# Patient Record
Sex: FEMALE | Race: OTHER | HISPANIC OR LATINO | URBAN - METROPOLITAN AREA
[De-identification: names, ages, dates, MRNs, and addresses within clinical notes are randomized per-mention and may not be internally consistent; named-entity substitution may affect disease eponyms.]

---

## 2017-04-20 ENCOUNTER — OUTPATIENT (OUTPATIENT)
Dept: OUTPATIENT SERVICES | Facility: HOSPITAL | Age: 57
LOS: 1 days | End: 2017-04-20
Payer: SELF-PAY

## 2017-04-20 ENCOUNTER — APPOINTMENT (OUTPATIENT)
Dept: FAMILY MEDICINE | Facility: HOSPITAL | Age: 57
End: 2017-04-20

## 2017-04-20 VITALS
OXYGEN SATURATION: 96 % | DIASTOLIC BLOOD PRESSURE: 87 MMHG | HEART RATE: 72 BPM | RESPIRATION RATE: 14 BRPM | WEIGHT: 141 LBS | BODY MASS INDEX: 26.62 KG/M2 | SYSTOLIC BLOOD PRESSURE: 125 MMHG | HEIGHT: 61 IN

## 2017-04-20 VITALS — TEMPERATURE: 97.6 F

## 2017-04-20 DIAGNOSIS — Z80.1 FAMILY HISTORY OF MALIGNANT NEOPLASM OF TRACHEA, BRONCHUS AND LUNG: ICD-10-CM

## 2017-04-20 DIAGNOSIS — Z86.39 PERSONAL HISTORY OF OTHER ENDOCRINE, NUTRITIONAL AND METABOLIC DISEASE: ICD-10-CM

## 2017-04-21 ENCOUNTER — FORM ENCOUNTER (OUTPATIENT)
Age: 57
End: 2017-04-21

## 2017-04-22 PROCEDURE — 36415 COLL VENOUS BLD VENIPUNCTURE: CPT

## 2017-04-22 PROCEDURE — 84439 ASSAY OF FREE THYROXINE: CPT

## 2017-04-22 PROCEDURE — 71045 X-RAY EXAM CHEST 1 VIEW: CPT

## 2017-04-22 PROCEDURE — 83036 HEMOGLOBIN GLYCOSYLATED A1C: CPT

## 2017-04-22 PROCEDURE — 80053 COMPREHEN METABOLIC PANEL: CPT

## 2017-04-22 PROCEDURE — 84443 ASSAY THYROID STIM HORMONE: CPT

## 2017-04-22 PROCEDURE — G0463: CPT

## 2017-04-22 PROCEDURE — 85025 COMPLETE CBC W/AUTO DIFF WBC: CPT

## 2017-04-22 PROCEDURE — 86480 TB TEST CELL IMMUN MEASURE: CPT

## 2017-04-22 PROCEDURE — 80061 LIPID PANEL: CPT

## 2017-04-27 ENCOUNTER — MED ADMIN CHARGE (OUTPATIENT)
Age: 57
End: 2017-04-27

## 2017-04-27 ENCOUNTER — OUTPATIENT (OUTPATIENT)
Dept: OUTPATIENT SERVICES | Facility: HOSPITAL | Age: 57
LOS: 1 days | End: 2017-04-27
Payer: SELF-PAY

## 2017-04-27 ENCOUNTER — APPOINTMENT (OUTPATIENT)
Dept: FAMILY MEDICINE | Facility: HOSPITAL | Age: 57
End: 2017-04-27

## 2017-04-27 VITALS
SYSTOLIC BLOOD PRESSURE: 132 MMHG | TEMPERATURE: 97 F | WEIGHT: 143 LBS | DIASTOLIC BLOOD PRESSURE: 80 MMHG | BODY MASS INDEX: 27 KG/M2 | HEIGHT: 61 IN | HEART RATE: 83 BPM | OXYGEN SATURATION: 98 % | RESPIRATION RATE: 14 BRPM

## 2017-04-27 PROCEDURE — 80307 DRUG TEST PRSMV CHEM ANLYZR: CPT

## 2017-04-27 PROCEDURE — G0463: CPT

## 2017-05-03 ENCOUNTER — OUTPATIENT (OUTPATIENT)
Dept: OUTPATIENT SERVICES | Facility: HOSPITAL | Age: 57
LOS: 1 days | End: 2017-05-03
Payer: COMMERCIAL

## 2017-05-03 ENCOUNTER — APPOINTMENT (OUTPATIENT)
Dept: MAMMOGRAPHY | Facility: IMAGING CENTER | Age: 57
End: 2017-05-03

## 2017-05-03 DIAGNOSIS — Z00.8 ENCOUNTER FOR OTHER GENERAL EXAMINATION: ICD-10-CM

## 2017-05-03 PROCEDURE — 77067 SCR MAMMO BI INCL CAD: CPT

## 2017-05-03 PROCEDURE — 77063 BREAST TOMOSYNTHESIS BI: CPT

## 2017-06-19 ENCOUNTER — RESULT CHARGE (OUTPATIENT)
Age: 57
End: 2017-06-19

## 2017-06-20 ENCOUNTER — APPOINTMENT (OUTPATIENT)
Dept: FAMILY MEDICINE | Facility: HOSPITAL | Age: 57
End: 2017-06-20

## 2017-06-20 ENCOUNTER — OUTPATIENT (OUTPATIENT)
Dept: OUTPATIENT SERVICES | Facility: HOSPITAL | Age: 57
LOS: 1 days | End: 2017-06-20
Payer: SELF-PAY

## 2017-06-20 VITALS
BODY MASS INDEX: 26.81 KG/M2 | SYSTOLIC BLOOD PRESSURE: 126 MMHG | RESPIRATION RATE: 14 BRPM | TEMPERATURE: 98.7 F | DIASTOLIC BLOOD PRESSURE: 82 MMHG | WEIGHT: 142 LBS | HEART RATE: 75 BPM | OXYGEN SATURATION: 97 % | HEIGHT: 61 IN

## 2017-06-20 DIAGNOSIS — Z86.39 PERSONAL HISTORY OF OTHER ENDOCRINE, NUTRITIONAL AND METABOLIC DISEASE: ICD-10-CM

## 2017-06-20 PROCEDURE — 93016 CV STRESS TEST SUPVJ ONLY: CPT

## 2017-06-20 PROCEDURE — 93010 ELECTROCARDIOGRAM REPORT: CPT

## 2017-06-20 PROCEDURE — 93018 CV STRESS TEST I&R ONLY: CPT

## 2017-06-22 PROCEDURE — 93017 CV STRESS TEST TRACING ONLY: CPT

## 2017-06-22 PROCEDURE — 93005 ELECTROCARDIOGRAM TRACING: CPT

## 2017-06-22 PROCEDURE — G0463: CPT

## 2017-07-13 ENCOUNTER — APPOINTMENT (OUTPATIENT)
Dept: FAMILY MEDICINE | Facility: HOSPITAL | Age: 57
End: 2017-07-13

## 2017-07-13 ENCOUNTER — OUTPATIENT (OUTPATIENT)
Dept: OUTPATIENT SERVICES | Facility: HOSPITAL | Age: 57
LOS: 1 days | End: 2017-07-13
Payer: SELF-PAY

## 2017-07-13 VITALS
TEMPERATURE: 98.2 F | HEART RATE: 74 BPM | OXYGEN SATURATION: 97 % | BODY MASS INDEX: 26.43 KG/M2 | RESPIRATION RATE: 14 BRPM | DIASTOLIC BLOOD PRESSURE: 90 MMHG | HEIGHT: 61 IN | SYSTOLIC BLOOD PRESSURE: 119 MMHG | WEIGHT: 140 LBS

## 2017-07-13 PROCEDURE — G0463: CPT

## 2017-07-20 ENCOUNTER — RESULT CHARGE (OUTPATIENT)
Age: 57
End: 2017-07-20

## 2017-07-21 ENCOUNTER — OUTPATIENT (OUTPATIENT)
Dept: OUTPATIENT SERVICES | Facility: HOSPITAL | Age: 57
LOS: 1 days | End: 2017-07-21
Payer: SELF-PAY

## 2017-07-21 ENCOUNTER — APPOINTMENT (OUTPATIENT)
Dept: CARDIOLOGY | Facility: HOSPITAL | Age: 57
End: 2017-07-21

## 2017-07-21 VITALS
HEART RATE: 75 BPM | RESPIRATION RATE: 14 BRPM | WEIGHT: 142 LBS | HEIGHT: 61 IN | TEMPERATURE: 97.8 F | DIASTOLIC BLOOD PRESSURE: 82 MMHG | OXYGEN SATURATION: 97 % | BODY MASS INDEX: 26.81 KG/M2 | SYSTOLIC BLOOD PRESSURE: 128 MMHG

## 2017-07-21 DIAGNOSIS — Z78.9 OTHER SPECIFIED HEALTH STATUS: ICD-10-CM

## 2017-07-21 PROCEDURE — 93005 ELECTROCARDIOGRAM TRACING: CPT

## 2017-07-21 PROCEDURE — G0463: CPT

## 2017-07-27 ENCOUNTER — FORM ENCOUNTER (OUTPATIENT)
Age: 57
End: 2017-07-27

## 2017-07-28 ENCOUNTER — OUTPATIENT (OUTPATIENT)
Dept: OUTPATIENT SERVICES | Facility: HOSPITAL | Age: 57
LOS: 1 days | End: 2017-07-28
Payer: SELF-PAY

## 2017-07-28 PROCEDURE — 93018 CV STRESS TEST I&R ONLY: CPT

## 2017-07-28 PROCEDURE — 78452 HT MUSCLE IMAGE SPECT MULT: CPT

## 2017-07-28 PROCEDURE — 93017 CV STRESS TEST TRACING ONLY: CPT

## 2017-07-28 PROCEDURE — 78452 HT MUSCLE IMAGE SPECT MULT: CPT | Mod: 26

## 2017-07-28 PROCEDURE — 93016 CV STRESS TEST SUPVJ ONLY: CPT

## 2017-07-28 PROCEDURE — A9500: CPT

## 2017-08-16 ENCOUNTER — APPOINTMENT (OUTPATIENT)
Dept: FAMILY MEDICINE | Facility: HOSPITAL | Age: 57
End: 2017-08-16

## 2017-08-25 ENCOUNTER — OUTPATIENT (OUTPATIENT)
Dept: OUTPATIENT SERVICES | Facility: HOSPITAL | Age: 57
LOS: 1 days | End: 2017-08-25
Payer: COMMERCIAL

## 2017-08-25 ENCOUNTER — APPOINTMENT (OUTPATIENT)
Dept: FAMILY MEDICINE | Facility: HOSPITAL | Age: 57
End: 2017-08-25

## 2017-08-25 VITALS
BODY MASS INDEX: 26.43 KG/M2 | HEART RATE: 81 BPM | RESPIRATION RATE: 14 BRPM | DIASTOLIC BLOOD PRESSURE: 78 MMHG | WEIGHT: 140 LBS | OXYGEN SATURATION: 98 % | TEMPERATURE: 98 F | HEIGHT: 61 IN | SYSTOLIC BLOOD PRESSURE: 117 MMHG

## 2017-08-25 PROCEDURE — G0463: CPT

## 2017-09-14 ENCOUNTER — APPOINTMENT (OUTPATIENT)
Dept: DERMATOLOGY | Facility: CLINIC | Age: 57
End: 2017-09-14
Payer: SELF-PAY

## 2017-09-14 VITALS
DIASTOLIC BLOOD PRESSURE: 76 MMHG | HEIGHT: 60 IN | BODY MASS INDEX: 27.88 KG/M2 | WEIGHT: 142 LBS | SYSTOLIC BLOOD PRESSURE: 124 MMHG

## 2017-09-14 DIAGNOSIS — Z86.39 PERSONAL HISTORY OF OTHER ENDOCRINE, NUTRITIONAL AND METABOLIC DISEASE: ICD-10-CM

## 2017-09-14 PROCEDURE — 17110 DESTRUCTION B9 LES UP TO 14: CPT

## 2017-09-14 PROCEDURE — 99203 OFFICE O/P NEW LOW 30 MIN: CPT | Mod: 25

## 2017-09-21 ENCOUNTER — APPOINTMENT (OUTPATIENT)
Dept: FAMILY MEDICINE | Facility: HOSPITAL | Age: 57
End: 2017-09-21

## 2017-11-21 ENCOUNTER — APPOINTMENT (OUTPATIENT)
Dept: GASTROENTEROLOGY | Facility: HOSPITAL | Age: 57
End: 2017-11-21

## 2018-02-22 ENCOUNTER — OUTPATIENT (OUTPATIENT)
Dept: OUTPATIENT SERVICES | Facility: HOSPITAL | Age: 58
LOS: 1 days | End: 2018-02-22
Payer: SELF-PAY

## 2018-02-22 ENCOUNTER — RESULT CHARGE (OUTPATIENT)
Age: 58
End: 2018-02-22

## 2018-02-22 ENCOUNTER — APPOINTMENT (OUTPATIENT)
Dept: FAMILY MEDICINE | Facility: HOSPITAL | Age: 58
End: 2018-02-22

## 2018-02-22 VITALS
TEMPERATURE: 97.5 F | HEART RATE: 67 BPM | SYSTOLIC BLOOD PRESSURE: 127 MMHG | RESPIRATION RATE: 16 BRPM | DIASTOLIC BLOOD PRESSURE: 84 MMHG | WEIGHT: 141 LBS | BODY MASS INDEX: 27.54 KG/M2 | OXYGEN SATURATION: 100 %

## 2018-02-22 DIAGNOSIS — Z00.00 ENCOUNTER FOR GENERAL ADULT MEDICAL EXAMINATION WITHOUT ABNORMAL FINDINGS: ICD-10-CM

## 2018-02-22 LAB
BILIRUB UR QL STRIP: NEGATIVE
GLUCOSE UR-MCNC: NEGATIVE
HCG UR QL: 0.2 EU/DL
HGB UR QL STRIP.AUTO: NEGATIVE
KETONES UR-MCNC: NEGATIVE
LEUKOCYTE ESTERASE UR QL STRIP: NEGATIVE
NITRITE UR QL STRIP: NEGATIVE
PH UR STRIP: 5.5
PROT UR STRIP-MCNC: NEGATIVE
SP GR UR STRIP: <= 1.005

## 2018-02-22 PROCEDURE — 87801 DETECT AGNT MULT DNA AMPLI: CPT

## 2018-02-22 PROCEDURE — 87563 M. GENITALIUM AMP PROBE: CPT

## 2018-02-22 PROCEDURE — 87798 DETECT AGENT NOS DNA AMP: CPT

## 2018-02-22 PROCEDURE — 87491 CHLMYD TRACH DNA AMP PROBE: CPT

## 2018-02-22 PROCEDURE — G0463: CPT

## 2018-02-22 PROCEDURE — G0010: CPT

## 2018-02-22 PROCEDURE — 87591 N.GONORRHOEAE DNA AMP PROB: CPT

## 2018-02-23 DIAGNOSIS — Z23 ENCOUNTER FOR IMMUNIZATION: ICD-10-CM

## 2018-02-23 DIAGNOSIS — R31.9 HEMATURIA, UNSPECIFIED: ICD-10-CM

## 2018-02-23 DIAGNOSIS — R30.0 DYSURIA: ICD-10-CM

## 2018-03-22 ENCOUNTER — APPOINTMENT (OUTPATIENT)
Dept: FAMILY MEDICINE | Facility: HOSPITAL | Age: 58
End: 2018-03-22

## 2018-03-22 ENCOUNTER — OUTPATIENT (OUTPATIENT)
Dept: OUTPATIENT SERVICES | Facility: HOSPITAL | Age: 58
LOS: 1 days | End: 2018-03-22
Payer: COMMERCIAL

## 2018-03-22 VITALS
SYSTOLIC BLOOD PRESSURE: 118 MMHG | OXYGEN SATURATION: 98 % | HEART RATE: 87 BPM | BODY MASS INDEX: 27.73 KG/M2 | WEIGHT: 142 LBS | DIASTOLIC BLOOD PRESSURE: 81 MMHG | RESPIRATION RATE: 16 BRPM | TEMPERATURE: 98.2 F

## 2018-03-22 DIAGNOSIS — Z00.00 ENCOUNTER FOR GENERAL ADULT MEDICAL EXAMINATION WITHOUT ABNORMAL FINDINGS: ICD-10-CM

## 2018-03-22 DIAGNOSIS — L91.8 OTHER HYPERTROPHIC DISORDERS OF THE SKIN: ICD-10-CM

## 2018-03-22 PROCEDURE — G0463: CPT

## 2018-03-22 RX ORDER — NITROFURANTOIN MACROCRYSTALS 100 MG/1
100 CAPSULE ORAL
Qty: 10 | Refills: 0 | Status: DISCONTINUED | COMMUNITY
Start: 2018-02-22 | End: 2018-03-22

## 2018-03-23 DIAGNOSIS — E78.5 HYPERLIPIDEMIA, UNSPECIFIED: ICD-10-CM

## 2018-03-23 DIAGNOSIS — E03.9 HYPOTHYROIDISM, UNSPECIFIED: ICD-10-CM

## 2018-03-26 ENCOUNTER — MOBILE ON CALL (OUTPATIENT)
Age: 58
End: 2018-03-26

## 2018-04-19 ENCOUNTER — OUTPATIENT (OUTPATIENT)
Dept: OUTPATIENT SERVICES | Facility: HOSPITAL | Age: 58
LOS: 1 days | End: 2018-04-19
Payer: COMMERCIAL

## 2018-04-19 DIAGNOSIS — E78.5 HYPERLIPIDEMIA, UNSPECIFIED: ICD-10-CM

## 2018-04-19 DIAGNOSIS — E03.9 HYPOTHYROIDISM, UNSPECIFIED: ICD-10-CM

## 2018-04-19 DIAGNOSIS — R73.03 PREDIABETES: ICD-10-CM

## 2018-04-19 PROCEDURE — 84439 ASSAY OF FREE THYROXINE: CPT

## 2018-04-19 PROCEDURE — 85025 COMPLETE CBC W/AUTO DIFF WBC: CPT

## 2018-04-19 PROCEDURE — 84443 ASSAY THYROID STIM HORMONE: CPT

## 2018-04-19 PROCEDURE — 80053 COMPREHEN METABOLIC PANEL: CPT

## 2018-04-19 PROCEDURE — 80061 LIPID PANEL: CPT

## 2018-04-19 PROCEDURE — 83036 HEMOGLOBIN GLYCOSYLATED A1C: CPT

## 2018-04-19 PROCEDURE — 36415 COLL VENOUS BLD VENIPUNCTURE: CPT

## 2018-04-23 ENCOUNTER — OUTPATIENT (OUTPATIENT)
Dept: OUTPATIENT SERVICES | Facility: HOSPITAL | Age: 58
LOS: 1 days | End: 2018-04-23
Payer: COMMERCIAL

## 2018-04-23 ENCOUNTER — APPOINTMENT (OUTPATIENT)
Dept: FAMILY MEDICINE | Facility: HOSPITAL | Age: 58
End: 2018-04-23

## 2018-04-23 VITALS
WEIGHT: 141 LBS | HEART RATE: 83 BPM | BODY MASS INDEX: 27.68 KG/M2 | DIASTOLIC BLOOD PRESSURE: 77 MMHG | HEIGHT: 60 IN | TEMPERATURE: 98.1 F | RESPIRATION RATE: 16 BRPM | SYSTOLIC BLOOD PRESSURE: 123 MMHG | OXYGEN SATURATION: 97 %

## 2018-04-23 DIAGNOSIS — Z00.00 ENCOUNTER FOR GENERAL ADULT MEDICAL EXAMINATION WITHOUT ABNORMAL FINDINGS: ICD-10-CM

## 2018-04-23 PROCEDURE — G0463: CPT

## 2018-04-24 DIAGNOSIS — J01.90 ACUTE SINUSITIS, UNSPECIFIED: ICD-10-CM

## 2018-04-24 DIAGNOSIS — E03.9 HYPOTHYROIDISM, UNSPECIFIED: ICD-10-CM

## 2018-05-04 ENCOUNTER — APPOINTMENT (OUTPATIENT)
Dept: MAMMOGRAPHY | Facility: IMAGING CENTER | Age: 58
End: 2018-05-04

## 2018-05-10 ENCOUNTER — OUTPATIENT (OUTPATIENT)
Dept: OUTPATIENT SERVICES | Facility: HOSPITAL | Age: 58
LOS: 1 days | End: 2018-05-10
Payer: COMMERCIAL

## 2018-05-10 ENCOUNTER — APPOINTMENT (OUTPATIENT)
Dept: MAMMOGRAPHY | Facility: IMAGING CENTER | Age: 58
End: 2018-05-10
Payer: COMMERCIAL

## 2018-05-10 DIAGNOSIS — Z00.8 ENCOUNTER FOR OTHER GENERAL EXAMINATION: ICD-10-CM

## 2018-05-10 PROCEDURE — 77063 BREAST TOMOSYNTHESIS BI: CPT

## 2018-05-10 PROCEDURE — 77067 SCR MAMMO BI INCL CAD: CPT | Mod: 26

## 2018-05-10 PROCEDURE — 77067 SCR MAMMO BI INCL CAD: CPT

## 2018-05-10 PROCEDURE — 77063 BREAST TOMOSYNTHESIS BI: CPT | Mod: 26

## 2018-06-30 ENCOUNTER — MOBILE ON CALL (OUTPATIENT)
Age: 58
End: 2018-06-30

## 2018-08-22 ENCOUNTER — RX RENEWAL (OUTPATIENT)
Age: 58
End: 2018-08-22

## 2018-09-05 ENCOUNTER — OUTPATIENT (OUTPATIENT)
Dept: OUTPATIENT SERVICES | Facility: HOSPITAL | Age: 58
LOS: 1 days | End: 2018-09-05
Payer: SELF-PAY

## 2018-09-05 ENCOUNTER — APPOINTMENT (OUTPATIENT)
Dept: FAMILY MEDICINE | Facility: HOSPITAL | Age: 58
End: 2018-09-05

## 2018-09-05 VITALS
DIASTOLIC BLOOD PRESSURE: 72 MMHG | HEART RATE: 74 BPM | WEIGHT: 135 LBS | TEMPERATURE: 97.4 F | BODY MASS INDEX: 26.5 KG/M2 | HEIGHT: 60 IN | RESPIRATION RATE: 14 BRPM | OXYGEN SATURATION: 98 % | SYSTOLIC BLOOD PRESSURE: 108 MMHG

## 2018-09-05 DIAGNOSIS — Z00.00 ENCOUNTER FOR GENERAL ADULT MEDICAL EXAMINATION WITHOUT ABNORMAL FINDINGS: ICD-10-CM

## 2018-09-05 PROCEDURE — 82274 ASSAY TEST FOR BLOOD FECAL: CPT

## 2018-09-05 PROCEDURE — G0463: CPT

## 2018-09-05 RX ORDER — AMOXICILLIN AND CLAVULANATE POTASSIUM 875; 125 MG/1; MG/1
875-125 TABLET, COATED ORAL TWICE DAILY
Qty: 14 | Refills: 0 | Status: DISCONTINUED | COMMUNITY
Start: 2018-04-23 | End: 2018-09-05

## 2018-09-05 RX ORDER — ATORVASTATIN CALCIUM 20 MG/1
20 TABLET, FILM COATED ORAL DAILY
Qty: 30 | Refills: 1 | Status: DISCONTINUED | COMMUNITY
Start: 2017-06-20 | End: 2018-09-05

## 2018-09-05 NOTE — HISTORY OF PRESENT ILLNESS
[FreeTextEntry1] : HLD and Hypothyroid follow up [de-identified] : 58 year old female with PMH of HLD, Hypothyroidism and prediabetes is here for a follow up. Pt states that she is compliant with her meds and feels well. No record of last PAP Smear. As per pt was 2 years ago. Due for Colon Cancer screening. Denies any complaints. No Fever, Chills, Nausea, Vomiting, Diarrhea, Headache, Chest Pain, Shortness of breath or Abdominal pain.\par

## 2018-09-05 NOTE — PHYSICAL EXAM

## 2018-09-05 NOTE — ASSESSMENT
[FreeTextEntry1] : # HLD\par - Pt is not on any medications and does not want to take any meds\par - Has been losing weight\par - Wants to manage it with lifestyle and diet changes\par - Repeat lipid panel in 6 months\par \par # Hypothyroidism - Stable\par - Cont Synthroid\par \par # Prediabetes\par - Cont Metformin\par - Counselled on diet and exercise\par \par # HM\par - FIT testing given\par - Mammogram May 2018 WNL repeat in 1 year\par - f/u for PAP smear\par

## 2018-09-05 NOTE — HEALTH RISK ASSESSMENT
[No falls in past year] : Patient reported no falls in the past year [0] : 2) Feeling down, depressed, or hopeless: Not at all (0) [] : No [[id number]] : 0

## 2018-09-07 DIAGNOSIS — Z12.11 ENCOUNTER FOR SCREENING FOR MALIGNANT NEOPLASM OF COLON: ICD-10-CM

## 2018-09-07 DIAGNOSIS — E03.9 HYPOTHYROIDISM, UNSPECIFIED: ICD-10-CM

## 2018-09-18 LAB — HEMOCCULT STL QL IA: NEGATIVE

## 2018-10-26 ENCOUNTER — OUTPATIENT (OUTPATIENT)
Dept: OUTPATIENT SERVICES | Facility: HOSPITAL | Age: 58
LOS: 1 days | End: 2018-10-26
Payer: SELF-PAY

## 2018-10-26 ENCOUNTER — APPOINTMENT (OUTPATIENT)
Dept: FAMILY MEDICINE | Facility: HOSPITAL | Age: 58
End: 2018-10-26

## 2018-10-26 VITALS
WEIGHT: 136 LBS | TEMPERATURE: 97.9 F | OXYGEN SATURATION: 97 % | BODY MASS INDEX: 26.56 KG/M2 | SYSTOLIC BLOOD PRESSURE: 138 MMHG | HEART RATE: 70 BPM | RESPIRATION RATE: 14 BRPM | DIASTOLIC BLOOD PRESSURE: 77 MMHG

## 2018-10-26 DIAGNOSIS — Z00.00 ENCOUNTER FOR GENERAL ADULT MEDICAL EXAMINATION WITHOUT ABNORMAL FINDINGS: ICD-10-CM

## 2018-10-26 PROCEDURE — 87624 HPV HI-RISK TYP POOLED RSLT: CPT

## 2018-10-26 PROCEDURE — G0463: CPT

## 2018-10-29 DIAGNOSIS — Z12.4 ENCOUNTER FOR SCREENING FOR MALIGNANT NEOPLASM OF CERVIX: ICD-10-CM

## 2018-10-29 LAB — HPV HIGH+LOW RISK DNA PNL CVX: NOT DETECTED

## 2018-11-08 LAB — CYTOLOGY CVX/VAG DOC THIN PREP: NORMAL

## 2019-02-21 ENCOUNTER — APPOINTMENT (OUTPATIENT)
Dept: FAMILY MEDICINE | Facility: HOSPITAL | Age: 59
End: 2019-02-21

## 2019-02-21 ENCOUNTER — OUTPATIENT (OUTPATIENT)
Dept: OUTPATIENT SERVICES | Facility: HOSPITAL | Age: 59
LOS: 1 days | End: 2019-02-21
Payer: SELF-PAY

## 2019-02-21 VITALS
OXYGEN SATURATION: 99 % | BODY MASS INDEX: 26.76 KG/M2 | TEMPERATURE: 98.2 F | HEART RATE: 77 BPM | SYSTOLIC BLOOD PRESSURE: 126 MMHG | RESPIRATION RATE: 14 BRPM | WEIGHT: 137 LBS | DIASTOLIC BLOOD PRESSURE: 83 MMHG

## 2019-02-21 DIAGNOSIS — Z00.00 ENCOUNTER FOR GENERAL ADULT MEDICAL EXAMINATION WITHOUT ABNORMAL FINDINGS: ICD-10-CM

## 2019-02-21 DIAGNOSIS — Z86.39 PERSONAL HISTORY OF OTHER ENDOCRINE, NUTRITIONAL AND METABOLIC DISEASE: ICD-10-CM

## 2019-02-21 RX ORDER — ASPIRIN 81 MG/1
81 TABLET, CHEWABLE ORAL DAILY
Qty: 1 | Refills: 1 | Status: DISCONTINUED | COMMUNITY
Start: 2017-06-20 | End: 2019-02-21

## 2019-02-21 RX ORDER — METFORMIN HYDROCHLORIDE 500 MG/1
500 TABLET, COATED ORAL DAILY
Qty: 90 | Refills: 2 | Status: DISCONTINUED | COMMUNITY
Start: 2018-04-23 | End: 2019-02-21

## 2019-02-21 NOTE — HISTORY OF PRESENT ILLNESS
[FreeTextEntry1] : follow up thyroid [de-identified] : 59 year old female w/ pmh hypothyroidism coming to clinic for follow up. patient states overall is feeling well. states taking medication as rx for thyroid. is not taking metformin at this time.

## 2019-02-21 NOTE — PLAN
[FreeTextEntry1] : 58 year old female as above \par \par #dysuria \par - poct ua- negative \par - nitrofuratoin x 5 days bid \par - vaginitis panel \par \par #need for hep b vaccine \par - number 3 given

## 2019-02-21 NOTE — REVIEW OF SYSTEMS
[Fever] : no fever [Chills] : no chills [Fatigue] : no fatigue [Discharge] : no discharge [Pain] : no pain [Vision Problems] : no vision problems [Earache] : no earache [Hearing Loss] : no hearing loss [Sore Throat] : no sore throat [Chest Pain] : no chest pain [Palpitations] : no palpitations [Shortness Of Breath] : no shortness of breath [Wheezing] : no wheezing [Cough] : no cough [Abdominal Pain] : no abdominal pain [Nausea] : no nausea [Vomiting] : no vomiting [Dysuria] : no dysuria [Hematuria] : no hematuria [Vaginal Discharge] : no vaginal discharge [Joint Pain] : no joint pain [Joint Stiffness] : no joint stiffness [Joint Swelling] : no joint swelling [Itching] : no itching [Headache] : no headache [Dizziness] : no dizziness

## 2019-02-21 NOTE — ASSESSMENT
[FreeTextEntry1] : 60 year old female as above \par \par #hypothyroidism \par - refill levothyroxine\par - check tft\par \par rtc 1 month

## 2019-02-21 NOTE — PHYSICAL EXAM
[No Acute Distress] : no acute distress [Well Nourished] : well nourished [PERRL] : pupils equal round and reactive to light [EOMI] : extraocular movements intact [Normal Outer Ear/Nose] : the outer ears and nose were normal in appearance [No JVD] : no jugular venous distention [Supple] : supple [No Respiratory Distress] : no respiratory distress  [Clear to Auscultation] : lungs were clear to auscultation bilaterally [No Accessory Muscle Use] : no accessory muscle use [Normal S1, S2] : normal S1 and S2 [No Edema] : there was no peripheral edema [Soft] : abdomen soft [Non Tender] : non-tender [Non-distended] : non-distended [Normal Bowel Sounds] : normal bowel sounds [No CVA Tenderness] : no CVA  tenderness [No Spinal Tenderness] : no spinal tenderness [Grossly Normal Strength/Tone] : grossly normal strength/tone [Normal Gait] : normal gait [No Focal Deficits] : no focal deficits [Normal Affect] : the affect was normal [Normal Insight/Judgement] : insight and judgment were intact

## 2019-02-22 DIAGNOSIS — E03.9 HYPOTHYROIDISM, UNSPECIFIED: ICD-10-CM

## 2019-04-01 ENCOUNTER — OUTPATIENT (OUTPATIENT)
Dept: OUTPATIENT SERVICES | Facility: HOSPITAL | Age: 59
LOS: 1 days | End: 2019-04-01

## 2019-04-01 DIAGNOSIS — Z00.00 ENCOUNTER FOR GENERAL ADULT MEDICAL EXAMINATION WITHOUT ABNORMAL FINDINGS: ICD-10-CM

## 2019-04-01 PROCEDURE — 84481 FREE ASSAY (FT-3): CPT

## 2019-04-01 PROCEDURE — G0463: CPT

## 2019-04-01 PROCEDURE — 84443 ASSAY THYROID STIM HORMONE: CPT

## 2019-04-01 PROCEDURE — 80061 LIPID PANEL: CPT

## 2019-04-01 PROCEDURE — 83036 HEMOGLOBIN GLYCOSYLATED A1C: CPT

## 2019-04-01 PROCEDURE — 80048 BASIC METABOLIC PNL TOTAL CA: CPT

## 2019-04-01 PROCEDURE — 84439 ASSAY OF FREE THYROXINE: CPT

## 2019-04-02 LAB
ANION GAP SERPL CALC-SCNC: 13 MMOL/L
BASOPHILS # BLD AUTO: 0.05 K/UL
BASOPHILS NFR BLD AUTO: 0.8 %
BUN SERPL-MCNC: 15 MG/DL
CALCIUM SERPL-MCNC: 9.6 MG/DL
CHLORIDE SERPL-SCNC: 102 MMOL/L
CO2 SERPL-SCNC: 25 MMOL/L
CREAT SERPL-MCNC: 0.73 MG/DL
EOSINOPHIL # BLD AUTO: 0.38 K/UL
EOSINOPHIL NFR BLD AUTO: 6.1 %
GLUCOSE SERPL-MCNC: 101 MG/DL
HCT VFR BLD CALC: 41.4 %
HGB BLD-MCNC: 13.7 G/DL
IMM GRANULOCYTES NFR BLD AUTO: 0.3 %
LYMPHOCYTES # BLD AUTO: 1.93 K/UL
LYMPHOCYTES NFR BLD AUTO: 30.9 %
MAN DIFF?: NORMAL
MCHC RBC-ENTMCNC: 29.3 PG
MCHC RBC-ENTMCNC: 33.1 GM/DL
MCV RBC AUTO: 88.5 FL
MONOCYTES # BLD AUTO: 0.65 K/UL
MONOCYTES NFR BLD AUTO: 10.4 %
NEUTROPHILS # BLD AUTO: 3.21 K/UL
NEUTROPHILS NFR BLD AUTO: 51.5 %
PLATELET # BLD AUTO: 295 K/UL
POTASSIUM SERPL-SCNC: 4.4 MMOL/L
RBC # BLD: 4.68 M/UL
RBC # FLD: 14.3 %
SODIUM SERPL-SCNC: 140 MMOL/L
T3FREE SERPL-MCNC: 3.07 PG/ML
T4 FREE SERPL-MCNC: 1.4 NG/DL
TSH SERPL-ACNC: 0.74 UIU/ML
WBC # FLD AUTO: 6.24 K/UL

## 2019-04-13 LAB
CHOLEST SERPL-MCNC: 251 MG/DL
CHOLEST/HDLC SERPL: 3.9 RATIO
HBA1C MFR BLD HPLC: 5.7 %
HDLC SERPL-MCNC: 65 MG/DL
LDLC SERPL CALC-MCNC: 160 MG/DL
TRIGL SERPL-MCNC: 132 MG/DL

## 2019-05-03 ENCOUNTER — APPOINTMENT (OUTPATIENT)
Age: 59
End: 2019-05-03

## 2019-05-03 ENCOUNTER — OUTPATIENT (OUTPATIENT)
Dept: OUTPATIENT SERVICES | Facility: HOSPITAL | Age: 59
LOS: 1 days | End: 2019-05-03
Payer: COMMERCIAL

## 2019-05-03 VITALS
SYSTOLIC BLOOD PRESSURE: 131 MMHG | DIASTOLIC BLOOD PRESSURE: 75 MMHG | HEIGHT: 60 IN | WEIGHT: 138 LBS | BODY MASS INDEX: 27.09 KG/M2 | RESPIRATION RATE: 14 BRPM | OXYGEN SATURATION: 97 % | TEMPERATURE: 98.1 F | HEART RATE: 82 BPM

## 2019-05-03 DIAGNOSIS — Z00.00 ENCOUNTER FOR GENERAL ADULT MEDICAL EXAMINATION WITHOUT ABNORMAL FINDINGS: ICD-10-CM

## 2019-05-03 PROCEDURE — G0463: CPT

## 2019-05-03 NOTE — PHYSICAL EXAM
[Well Nourished] : well nourished [Well Developed] : well developed [No Acute Distress] : no acute distress [Well-Appearing] : well-appearing [No Accessory Muscle Use] : no accessory muscle use [No Respiratory Distress] : no respiratory distress  [Clear to Auscultation] : lungs were clear to auscultation bilaterally [Normal S1, S2] : normal S1 and S2 [Regular Rhythm] : with a regular rhythm [Normal Rate] : normal rate  [No Murmur] : no murmur heard [Alert and Oriented x3] : oriented to person, place, and time [Normal Affect] : the affect was normal [Normal Insight/Judgement] : insight and judgment were intact

## 2019-05-03 NOTE — ASSESSMENT
[FreeTextEntry1] : # Hypothyroidism\par - Cont Synthroid\par \par # HM\par - Mammogram referral given\par - Discussed all results\par \par f/u PRN

## 2019-05-03 NOTE — HISTORY OF PRESENT ILLNESS
[FreeTextEntry1] : Hypothyroid follow up [de-identified] : 59 year old female is here for a follow up. Pt is doing well. Follows a well balanced diet.  Denies any complaints. No Fever, Chills, Nausea, Vomiting, Diarrhea, Headache, Chest Pain, Shortness of breath or Abdominal pain.\par

## 2019-05-06 DIAGNOSIS — Z12.31 ENCOUNTER FOR SCREENING MAMMOGRAM FOR MALIGNANT NEOPLASM OF BREAST: ICD-10-CM

## 2019-05-06 DIAGNOSIS — E03.9 HYPOTHYROIDISM, UNSPECIFIED: ICD-10-CM

## 2019-08-06 ENCOUNTER — RX RENEWAL (OUTPATIENT)
Age: 59
End: 2019-08-06

## 2019-12-03 ENCOUNTER — APPOINTMENT (OUTPATIENT)
Dept: FAMILY MEDICINE | Facility: HOSPITAL | Age: 59
End: 2019-12-03

## 2019-12-03 ENCOUNTER — OUTPATIENT (OUTPATIENT)
Dept: OUTPATIENT SERVICES | Facility: HOSPITAL | Age: 59
LOS: 1 days | End: 2019-12-03
Payer: COMMERCIAL

## 2019-12-03 VITALS
OXYGEN SATURATION: 96 % | DIASTOLIC BLOOD PRESSURE: 84 MMHG | BODY MASS INDEX: 26.17 KG/M2 | SYSTOLIC BLOOD PRESSURE: 137 MMHG | TEMPERATURE: 98 F | RESPIRATION RATE: 144 BRPM | WEIGHT: 134 LBS | HEART RATE: 84 BPM

## 2019-12-03 DIAGNOSIS — Z00.00 ENCOUNTER FOR GENERAL ADULT MEDICAL EXAMINATION WITHOUT ABNORMAL FINDINGS: ICD-10-CM

## 2019-12-03 DIAGNOSIS — Z92.89 PERSONAL HISTORY OF OTHER MEDICAL TREATMENT: ICD-10-CM

## 2019-12-03 PROCEDURE — G0463: CPT
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
## 2019-12-03 NOTE — PHYSICAL EXAM
[No Acute Distress] : no acute distress [Well Developed] : well developed [Well-Appearing] : well-appearing [Well Nourished] : well nourished [Normal Sclera/Conjunctiva] : normal sclera/conjunctiva [Normal Outer Ear/Nose] : the outer ears and nose were normal in appearance [No Respiratory Distress] : no respiratory distress  [Regular Rhythm] : with a regular rhythm [Normal Rate] : normal rate  [Alert and Oriented x3] : oriented to person, place, and time [Normal S1, S2] : normal S1 and S2 [Normal Gait] : normal gait [Normal Mental Status] : the patient's orientation, memory, attention, language and fund of knowledge were normal [Appropriate] : appropriate [Depressed] : depressed

## 2019-12-06 DIAGNOSIS — Z63.5 DISRUPTION OF FAMILY BY SEPARATION AND DIVORCE: ICD-10-CM

## 2019-12-06 DIAGNOSIS — F41.9 ANXIETY DISORDER, UNSPECIFIED: ICD-10-CM

## 2019-12-06 SDOH — SOCIAL STABILITY - SOCIAL INSECURITY: DISRUPTION OF FAMILY BY SEPARATION AND DIVORCE: Z63.5

## 2019-12-09 ENCOUNTER — APPOINTMENT (OUTPATIENT)
Dept: FAMILY MEDICINE | Facility: HOSPITAL | Age: 59
End: 2019-12-09

## 2019-12-16 ENCOUNTER — APPOINTMENT (OUTPATIENT)
Dept: FAMILY MEDICINE | Facility: HOSPITAL | Age: 59
End: 2019-12-16

## 2019-12-23 ENCOUNTER — APPOINTMENT (OUTPATIENT)
Dept: FAMILY MEDICINE | Facility: HOSPITAL | Age: 59
End: 2019-12-23

## 2019-12-30 ENCOUNTER — APPOINTMENT (OUTPATIENT)
Dept: FAMILY MEDICINE | Facility: HOSPITAL | Age: 59
End: 2019-12-30

## 2020-01-11 ENCOUNTER — APPOINTMENT (OUTPATIENT)
Dept: FAMILY MEDICINE | Facility: HOSPITAL | Age: 60
End: 2020-01-11

## 2020-01-11 ENCOUNTER — OUTPATIENT (OUTPATIENT)
Dept: OUTPATIENT SERVICES | Facility: HOSPITAL | Age: 60
LOS: 1 days | End: 2020-01-11
Payer: COMMERCIAL

## 2020-01-11 VITALS
BODY MASS INDEX: 26.56 KG/M2 | TEMPERATURE: 97.3 F | RESPIRATION RATE: 14 BRPM | WEIGHT: 136 LBS | OXYGEN SATURATION: 98 % | HEART RATE: 75 BPM | DIASTOLIC BLOOD PRESSURE: 90 MMHG | SYSTOLIC BLOOD PRESSURE: 147 MMHG

## 2020-01-11 DIAGNOSIS — Z00.00 ENCOUNTER FOR GENERAL ADULT MEDICAL EXAMINATION WITHOUT ABNORMAL FINDINGS: ICD-10-CM

## 2020-01-11 PROCEDURE — G0463: CPT

## 2020-01-11 NOTE — HEALTH RISK ASSESSMENT
[0] : 1) Little interest or pleasure doing things: Not at all (0) [1] : 2) Feeling down, depressed, or hopeless for several days (1) [[id number]] : 1

## 2020-01-11 NOTE — ASSESSMENT
[FreeTextEntry1] : 59F w/ PMH as above comes in for f/u meds.\par \par 1. Anxiety/acute stress reaction\par Paroxetine DCed for side effects\par Continue seeing Isabelle SW\par \par 2. Elevated BP reading\par Recommended decrease salt intake, exercise\par Check BP 2-3x weekly, write down numbers, bring to next appointment\par \par 3. Difficulty sleeping\par Sent Sleep Aid (diphenhydramine) to pharmacy to take before bedtime\par \par RTC in 1 month for f/u insomnia and elevated BP with PCP Dr. Prieto
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
## 2020-01-11 NOTE — COUNSELING
[Encouraged to maintain food diary] : Encouraged to maintain food diary [Encouraged to increase physical activity] : Encouraged to increase physical activity [Needs reinforcement, provided] : Patient needs reinforcement on understanding of disease, goals and obesity follow-up plan; reinforcement was provided [FreeTextEntry2] : Decrease salt intake

## 2020-01-11 NOTE — PHYSICAL EXAM
[Well Nourished] : well nourished [No Acute Distress] : no acute distress [Well Developed] : well developed [No Accessory Muscle Use] : no accessory muscle use [Well-Appearing] : well-appearing [No Respiratory Distress] : no respiratory distress  [Normal Rate] : normal rate  [Regular Rhythm] : with a regular rhythm [Clear to Auscultation] : lungs were clear to auscultation bilaterally [No Murmur] : no murmur heard [Normal S1, S2] : normal S1 and S2 [Normal Affect] : the affect was normal [Normal Anterior Cervical Nodes] : no anterior cervical lymphadenopathy [Normal Posterior Cervical Nodes] : no posterior cervical lymphadenopathy [Normal Insight/Judgement] : insight and judgment were intact [Alert and Oriented x3] : oriented to person, place, and time

## 2020-01-14 DIAGNOSIS — G47.9 SLEEP DISORDER, UNSPECIFIED: ICD-10-CM

## 2020-02-21 ENCOUNTER — OUTPATIENT (OUTPATIENT)
Dept: OUTPATIENT SERVICES | Facility: HOSPITAL | Age: 60
LOS: 1 days | End: 2020-02-21
Payer: COMMERCIAL

## 2020-02-21 ENCOUNTER — APPOINTMENT (OUTPATIENT)
Dept: FAMILY MEDICINE | Facility: HOSPITAL | Age: 60
End: 2020-02-21

## 2020-02-21 VITALS
DIASTOLIC BLOOD PRESSURE: 83 MMHG | WEIGHT: 138 LBS | SYSTOLIC BLOOD PRESSURE: 120 MMHG | BODY MASS INDEX: 26.95 KG/M2 | RESPIRATION RATE: 14 BRPM | TEMPERATURE: 96.7 F | HEART RATE: 73 BPM | OXYGEN SATURATION: 98 %

## 2020-02-21 DIAGNOSIS — Z72.821 INADEQUATE SLEEP HYGIENE: ICD-10-CM

## 2020-02-21 DIAGNOSIS — Z00.00 ENCOUNTER FOR GENERAL ADULT MEDICAL EXAMINATION WITHOUT ABNORMAL FINDINGS: ICD-10-CM

## 2020-02-21 DIAGNOSIS — F43.0 ACUTE STRESS REACTION: ICD-10-CM

## 2020-02-21 PROCEDURE — G0463: CPT

## 2020-02-21 PROCEDURE — 84443 ASSAY THYROID STIM HORMONE: CPT

## 2020-02-21 PROCEDURE — 36415 COLL VENOUS BLD VENIPUNCTURE: CPT

## 2020-02-21 RX ORDER — DIPHENHYDRAMINE HCL 25 MG/1
25 CAPSULE, LIQUID FILLED ORAL AT BEDTIME
Qty: 30 | Refills: 0 | Status: COMPLETED | COMMUNITY
Start: 2020-01-11 | End: 2020-02-21

## 2020-02-21 NOTE — PHYSICAL EXAM
[No Acute Distress] : no acute distress [Well Nourished] : well nourished [Well Developed] : well developed [Well-Appearing] : well-appearing [Normal Sclera/Conjunctiva] : normal sclera/conjunctiva [Normal Outer Ear/Nose] : the outer ears and nose were normal in appearance [EOMI] : extraocular movements intact [No Respiratory Distress] : no respiratory distress  [Normal Rate] : normal rate  [Regular Rhythm] : with a regular rhythm [Normal S1, S2] : normal S1 and S2 [Normal Gait] : normal gait [Alert and Oriented x3] : oriented to person, place, and time [Normal Mental Status] : the patient's orientation, memory, attention, language and fund of knowledge were normal [Appropriate] : appropriate
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
## 2020-02-22 NOTE — REVIEW OF SYSTEMS
[Negative] : Heme/Lymph [Chest Pain] : no chest pain [Chills] : no chills [Fever] : no fever [Shortness Of Breath] : no shortness of breath [Palpitations] : no palpitations [Cough] : no cough [Abdominal Pain] : no abdominal pain [Vomiting] : no vomiting [Nausea] : no nausea [Suicidal] : not suicidal [Insomnia] : no insomnia [Anxiety] : no anxiety [de-identified] : insomnia now resolved [Depression] : no depression
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
## 2020-02-25 LAB — TSH SERPL-ACNC: 2.77 UIU/ML

## 2020-02-27 DIAGNOSIS — E03.9 HYPOTHYROIDISM, UNSPECIFIED: ICD-10-CM

## 2020-02-27 DIAGNOSIS — Z12.39 ENCOUNTER FOR OTHER SCREENING FOR MALIGNANT NEOPLASM OF BREAST: ICD-10-CM

## 2020-02-27 DIAGNOSIS — Z12.11 ENCOUNTER FOR SCREENING FOR MALIGNANT NEOPLASM OF COLON: ICD-10-CM

## 2020-02-27 DIAGNOSIS — F43.0 ACUTE STRESS REACTION: ICD-10-CM

## 2020-03-16 ENCOUNTER — APPOINTMENT (OUTPATIENT)
Dept: FAMILY MEDICINE | Facility: HOSPITAL | Age: 60
End: 2020-03-16

## 2021-03-03 ENCOUNTER — APPOINTMENT (OUTPATIENT)
Dept: FAMILY MEDICINE | Facility: HOSPITAL | Age: 61
End: 2021-03-03

## 2021-03-03 ENCOUNTER — OUTPATIENT (OUTPATIENT)
Dept: OUTPATIENT SERVICES | Facility: HOSPITAL | Age: 61
LOS: 1 days | End: 2021-03-03
Payer: COMMERCIAL

## 2021-03-03 VITALS
DIASTOLIC BLOOD PRESSURE: 89 MMHG | SYSTOLIC BLOOD PRESSURE: 151 MMHG | TEMPERATURE: 97.3 F | HEART RATE: 79 BPM | RESPIRATION RATE: 16 BRPM | OXYGEN SATURATION: 98 %

## 2021-03-03 DIAGNOSIS — L85.3 XEROSIS CUTIS: ICD-10-CM

## 2021-03-03 DIAGNOSIS — Z12.11 ENCOUNTER FOR SCREENING FOR MALIGNANT NEOPLASM OF COLON: ICD-10-CM

## 2021-03-03 DIAGNOSIS — Z87.448 PERSONAL HISTORY OF OTHER DISEASES OF URINARY SYSTEM: ICD-10-CM

## 2021-03-03 DIAGNOSIS — Z92.29 PERSONAL HISTORY OF OTHER DRUG THERAPY: ICD-10-CM

## 2021-03-03 DIAGNOSIS — Z92.89 PERSONAL HISTORY OF OTHER MEDICAL TREATMENT: ICD-10-CM

## 2021-03-03 DIAGNOSIS — Z12.12 ENCOUNTER FOR SCREENING FOR MALIGNANT NEOPLASM OF COLON: ICD-10-CM

## 2021-03-03 DIAGNOSIS — Z00.00 ENCOUNTER FOR GENERAL ADULT MEDICAL EXAMINATION WITHOUT ABNORMAL FINDINGS: ICD-10-CM

## 2021-03-03 DIAGNOSIS — L82.0 INFLAMED SEBORRHEIC KERATOSIS: ICD-10-CM

## 2021-03-03 DIAGNOSIS — R07.89 OTHER CHEST PAIN: ICD-10-CM

## 2021-03-03 DIAGNOSIS — Z87.09 PERSONAL HISTORY OF OTHER DISEASES OF THE RESPIRATORY SYSTEM: ICD-10-CM

## 2021-03-03 DIAGNOSIS — Z87.898 PERSONAL HISTORY OF OTHER SPECIFIED CONDITIONS: ICD-10-CM

## 2021-03-03 PROCEDURE — 80061 LIPID PANEL: CPT

## 2021-03-03 PROCEDURE — 80048 BASIC METABOLIC PNL TOTAL CA: CPT

## 2021-03-03 PROCEDURE — 84443 ASSAY THYROID STIM HORMONE: CPT

## 2021-03-03 PROCEDURE — 86762 RUBELLA ANTIBODY: CPT

## 2021-03-03 PROCEDURE — G0463: CPT

## 2021-03-03 PROCEDURE — 85025 COMPLETE CBC W/AUTO DIFF WBC: CPT

## 2021-03-03 PROCEDURE — 86765 RUBEOLA ANTIBODY: CPT

## 2021-03-03 PROCEDURE — 80307 DRUG TEST PRSMV CHEM ANLYZR: CPT

## 2021-03-03 PROCEDURE — 86769 SARS-COV-2 COVID-19 ANTIBODY: CPT

## 2021-03-04 ENCOUNTER — NON-APPOINTMENT (OUTPATIENT)
Age: 61
End: 2021-03-04

## 2021-03-04 DIAGNOSIS — Z29.9 ENCOUNTER FOR PROPHYLACTIC MEASURES, UNSPECIFIED: ICD-10-CM

## 2021-03-04 DIAGNOSIS — Z12.39 ENCOUNTER FOR OTHER SCREENING FOR MALIGNANT NEOPLASM OF BREAST: ICD-10-CM

## 2021-03-04 DIAGNOSIS — Z12.11 ENCOUNTER FOR SCREENING FOR MALIGNANT NEOPLASM OF COLON: ICD-10-CM

## 2021-03-04 LAB
ANION GAP SERPL CALC-SCNC: 11 MMOL/L
BASOPHILS # BLD AUTO: 0.07 K/UL
BASOPHILS NFR BLD AUTO: 1.3 %
BUN SERPL-MCNC: 13 MG/DL
CALCIUM SERPL-MCNC: 10.3 MG/DL
CHLORIDE SERPL-SCNC: 103 MMOL/L
CHOLEST SERPL-MCNC: 271 MG/DL
CO2 SERPL-SCNC: 26 MMOL/L
CREAT SERPL-MCNC: 0.75 MG/DL
EOSINOPHIL # BLD AUTO: 0.23 K/UL
EOSINOPHIL NFR BLD AUTO: 4.3 %
GLUCOSE SERPL-MCNC: 103 MG/DL
HCT VFR BLD CALC: 44.6 %
HDLC SERPL-MCNC: 60 MG/DL
HGB BLD-MCNC: 14.5 G/DL
IMM GRANULOCYTES NFR BLD AUTO: 0.2 %
LDLC SERPL CALC-MCNC: 165 MG/DL
LYMPHOCYTES # BLD AUTO: 2.13 K/UL
LYMPHOCYTES NFR BLD AUTO: 39.7 %
MAN DIFF?: NORMAL
MCHC RBC-ENTMCNC: 29.5 PG
MCHC RBC-ENTMCNC: 32.5 GM/DL
MCV RBC AUTO: 90.7 FL
MONOCYTES # BLD AUTO: 0.55 K/UL
MONOCYTES NFR BLD AUTO: 10.2 %
NEUTROPHILS # BLD AUTO: 2.38 K/UL
NEUTROPHILS NFR BLD AUTO: 44.3 %
NONHDLC SERPL-MCNC: 211 MG/DL
PLATELET # BLD AUTO: 309 K/UL
POTASSIUM SERPL-SCNC: 4.3 MMOL/L
RBC # BLD: 4.92 M/UL
RBC # FLD: 14.1 %
SARS-COV-2 IGG SERPL IA-ACNC: 0.08 INDEX
SARS-COV-2 IGG SERPL QL IA: NEGATIVE
SODIUM SERPL-SCNC: 140 MMOL/L
TRIGL SERPL-MCNC: 229 MG/DL
TSH SERPL-ACNC: 6.63 UIU/ML
WBC # FLD AUTO: 5.37 K/UL

## 2021-03-04 NOTE — HEALTH RISK ASSESSMENT
[Good] : ~his/her~  mood as  good [No] : No [No falls in past year] : Patient reported no falls in the past year [Alone] : lives alone [Fully functional (bathing, dressing, toileting, transferring, walking, feeding)] : Fully functional (bathing, dressing, toileting, transferring, walking, feeding) [Fully functional (using the telephone, shopping, preparing meals, housekeeping, doing laundry, using] : Fully functional and needs no help or supervision to perform IADLs (using the telephone, shopping, preparing meals, housekeeping, doing laundry, using transportation, managing medications and managing finances) [] : No

## 2021-03-04 NOTE — PHYSICAL EXAM
[No Acute Distress] : no acute distress [Well Developed] : well developed [Well-Appearing] : well-appearing [Normal Sclera/Conjunctiva] : normal sclera/conjunctiva [PERRL] : pupils equal round and reactive to light [EOMI] : extraocular movements intact [Normal Oropharynx] : the oropharynx was normal [No JVD] : no jugular venous distention [No Lymphadenopathy] : no lymphadenopathy [Supple] : supple [Thyroid Normal, No Nodules] : the thyroid was normal and there were no nodules present [No Respiratory Distress] : no respiratory distress  [Clear to Auscultation] : lungs were clear to auscultation bilaterally [Normal Rate] : normal rate  [Regular Rhythm] : with a regular rhythm [Normal S1, S2] : normal S1 and S2 [No Murmur] : no murmur heard [Soft] : abdomen soft [Non Tender] : non-tender [Non-distended] : non-distended [No Masses] : no abdominal mass palpated [Normal Bowel Sounds] : normal bowel sounds [No Spinal Tenderness] : no spinal tenderness [No Joint Swelling] : no joint swelling [Grossly Normal Strength/Tone] : grossly normal strength/tone [No Rash] : no rash [No Focal Deficits] : no focal deficits [Normal Gait] : normal gait [Deep Tendon Reflexes (DTR)] : deep tendon reflexes were 2+ and symmetric [Normal Affect] : the affect was normal [Normal Insight/Judgement] : insight and judgment were intact [de-identified] : +cerumen buildup of R ear, no erythema or swelling of ear canal; L ear intact

## 2021-03-04 NOTE — HISTORY OF PRESENT ILLNESS
[de-identified] : 60 yo female with hypothyroidism, preDM, anxiety hx presenting for CPE. Pt reports fullness in the right ear without ear pain, tinnitus or hearing loss. States that it feels as if she got water in there. otherwise has no other complaints to offer. 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 Pt reports needing titers and form completion for renewal of employment contract.

## 2021-03-11 ENCOUNTER — OUTPATIENT (OUTPATIENT)
Dept: OUTPATIENT SERVICES | Facility: HOSPITAL | Age: 61
LOS: 1 days | End: 2021-03-11
Payer: COMMERCIAL

## 2021-03-11 ENCOUNTER — APPOINTMENT (OUTPATIENT)
Dept: FAMILY MEDICINE | Facility: HOSPITAL | Age: 61
End: 2021-03-11

## 2021-03-11 VITALS
WEIGHT: 142 LBS | DIASTOLIC BLOOD PRESSURE: 85 MMHG | OXYGEN SATURATION: 99 % | BODY MASS INDEX: 27.73 KG/M2 | TEMPERATURE: 97.3 F | HEART RATE: 84 BPM | SYSTOLIC BLOOD PRESSURE: 122 MMHG | RESPIRATION RATE: 16 BRPM

## 2021-03-11 DIAGNOSIS — Z92.89 PERSONAL HISTORY OF OTHER MEDICAL TREATMENT: ICD-10-CM

## 2021-03-11 DIAGNOSIS — Z00.00 ENCOUNTER FOR GENERAL ADULT MEDICAL EXAMINATION WITHOUT ABNORMAL FINDINGS: ICD-10-CM

## 2021-03-11 DIAGNOSIS — Z01.84 ENCOUNTER FOR ANTIBODY RESPONSE EXAMINATION: ICD-10-CM

## 2021-03-11 DIAGNOSIS — Z12.11 ENCOUNTER FOR SCREENING FOR MALIGNANT NEOPLASM OF COLON: ICD-10-CM

## 2021-03-11 DIAGNOSIS — Z29.9 ENCOUNTER FOR PROPHYLACTIC MEASURES, UNSPECIFIED: ICD-10-CM

## 2021-03-11 LAB
AMPHET UR-MCNC: NEGATIVE
BARBITURATES UR-MCNC: NEGATIVE
BENZODIAZ UR-MCNC: NEGATIVE
COCAINE METAB.OTHER UR-MCNC: NEGATIVE
CREATININE, URINE: 83.3 MG/DL
METHADONE UR-MCNC: NEGATIVE
METHAQUALONE UR-MCNC: NEGATIVE
MEV IGG FLD QL IA: >300 AU/ML
MEV IGG+IGM SER-IMP: POSITIVE
OPIATES UR-MCNC: NEGATIVE
PCP UR-MCNC: NEGATIVE
PROPOXYPH UR QL: NEGATIVE
RUBV IGG FLD-ACNC: 21.1 INDEX
RUBV IGG SER-IMP: POSITIVE
THC UR QL: NEGATIVE

## 2021-03-11 PROCEDURE — G0463: CPT

## 2021-03-11 NOTE — PHYSICAL EXAM
[No Acute Distress] : no acute distress [Well-Appearing] : well-appearing [EOMI] : extraocular movements intact [No Respiratory Distress] : no respiratory distress  [No Focal Deficits] : no focal deficits [Normal Affect] : the affect was normal [Normal Insight/Judgement] : insight and judgment were intact no

## 2021-03-12 DIAGNOSIS — Z02.89 ENCOUNTER FOR OTHER ADMINISTRATIVE EXAMINATIONS: ICD-10-CM

## 2021-03-12 NOTE — HISTORY OF PRESENT ILLNESS
[de-identified] : 60 yo female presenting today for completion of employment papers. Pt had rubella and rubeola titers drawan on last clinic visit as part of employment lab requirement. U-drug screen also was obtained. Pt with no acute problems today. Levothyroxine dose was increased from 75mcg to 88mcg after TSH level resulted with level of 6.63. Pt has picked up new dose rx and has been taking.

## 2021-04-05 ENCOUNTER — APPOINTMENT (OUTPATIENT)
Dept: MAMMOGRAPHY | Facility: IMAGING CENTER | Age: 61
End: 2021-04-05
Payer: MEDICAID

## 2021-04-05 ENCOUNTER — OUTPATIENT (OUTPATIENT)
Dept: OUTPATIENT SERVICES | Facility: HOSPITAL | Age: 61
LOS: 1 days | End: 2021-04-05
Payer: COMMERCIAL

## 2021-04-05 ENCOUNTER — RESULT REVIEW (OUTPATIENT)
Age: 61
End: 2021-04-05

## 2021-04-05 DIAGNOSIS — Z92.89 PERSONAL HISTORY OF OTHER MEDICAL TREATMENT: ICD-10-CM

## 2021-04-05 PROCEDURE — 77067 SCR MAMMO BI INCL CAD: CPT

## 2021-04-05 PROCEDURE — 77063 BREAST TOMOSYNTHESIS BI: CPT | Mod: 26

## 2021-04-05 PROCEDURE — 77063 BREAST TOMOSYNTHESIS BI: CPT

## 2021-04-05 PROCEDURE — 77067 SCR MAMMO BI INCL CAD: CPT | Mod: 26

## 2021-04-24 ENCOUNTER — APPOINTMENT (OUTPATIENT)
Dept: FAMILY MEDICINE | Facility: HOSPITAL | Age: 61
End: 2021-04-24

## 2021-04-24 ENCOUNTER — OUTPATIENT (OUTPATIENT)
Dept: OUTPATIENT SERVICES | Facility: HOSPITAL | Age: 61
LOS: 1 days | End: 2021-04-24
Payer: COMMERCIAL

## 2021-04-24 VITALS
HEART RATE: 78 BPM | WEIGHT: 140 LBS | SYSTOLIC BLOOD PRESSURE: 129 MMHG | BODY MASS INDEX: 27.48 KG/M2 | HEIGHT: 60 IN | TEMPERATURE: 98 F | RESPIRATION RATE: 16 BRPM | OXYGEN SATURATION: 97 % | DIASTOLIC BLOOD PRESSURE: 81 MMHG

## 2021-04-24 DIAGNOSIS — Z00.00 ENCOUNTER FOR GENERAL ADULT MEDICAL EXAMINATION WITHOUT ABNORMAL FINDINGS: ICD-10-CM

## 2021-04-24 DIAGNOSIS — Z63.5 DISRUPTION OF FAMILY BY SEPARATION AND DIVORCE: ICD-10-CM

## 2021-04-24 DIAGNOSIS — H61.21 IMPACTED CERUMEN, RIGHT EAR: ICD-10-CM

## 2021-04-24 DIAGNOSIS — Z02.89 ENCOUNTER FOR OTHER ADMINISTRATIVE EXAMINATIONS: ICD-10-CM

## 2021-04-24 LAB
BILIRUB UR QL STRIP: NORMAL
CLARITY UR: CLEAR
GLUCOSE UR-MCNC: NORMAL
HCG UR QL: 0.2 EU/DL
HGB UR QL STRIP.AUTO: NORMAL
KETONES UR-MCNC: NORMAL
LEUKOCYTE ESTERASE UR QL STRIP: NORMAL
NITRITE UR QL STRIP: NORMAL
PH UR STRIP: 6.5
PROT UR STRIP-MCNC: NORMAL
SP GR UR STRIP: 1.02

## 2021-04-24 PROCEDURE — 84443 ASSAY THYROID STIM HORMONE: CPT

## 2021-04-24 PROCEDURE — G0463: CPT

## 2021-04-24 PROCEDURE — 36415 COLL VENOUS BLD VENIPUNCTURE: CPT

## 2021-04-24 RX ORDER — ASPIRIN 81 MG
6.5 TABLET, DELAYED RELEASE (ENTERIC COATED) ORAL
Qty: 1 | Refills: 0 | Status: DISCONTINUED | COMMUNITY
Start: 2021-03-03 | End: 2021-04-24

## 2021-04-24 SDOH — SOCIAL STABILITY - SOCIAL INSECURITY: DISRUPTION OF FAMILY BY SEPARATION AND DIVORCE: Z63.5

## 2021-04-24 NOTE — PHYSICAL EXAM
[No Acute Distress] : no acute distress [Well-Appearing] : well-appearing [Normal Sclera/Conjunctiva] : normal sclera/conjunctiva [PERRL] : pupils equal round and reactive to light [EOMI] : extraocular movements intact [No Lymphadenopathy] : no lymphadenopathy [Supple] : supple [Thyroid Normal, No Nodules] : the thyroid was normal and there were no nodules present [Normal Rate] : normal rate  [Regular Rhythm] : with a regular rhythm [Normal S1, S2] : normal S1 and S2 [No Murmur] : no murmur heard [No Edema] : there was no peripheral edema [Soft] : abdomen soft [Non Tender] : non-tender [Non-distended] : non-distended [No Masses] : no abdominal mass palpated [Normal Bowel Sounds] : normal bowel sounds [Grossly Normal Strength/Tone] : grossly normal strength/tone [Normal Gait] : normal gait [No Focal Deficits] : no focal deficits [Deep Tendon Reflexes (DTR)] : deep tendon reflexes were 2+ and symmetric [Normal Affect] : the affect was normal

## 2021-04-25 NOTE — ASSESSMENT
[FreeTextEntry1] : #health maintenance\par - Benefits of COVID vaccination discussed with pt today, pt however declined, reports past reaction with flu vaccinations, pt adamant about not wanting the COVID vaccination.

## 2021-04-25 NOTE — HISTORY OF PRESENT ILLNESS
[de-identified] : 62 yo female with h/o hypothyroidism, HLD, anxiety,  presenting for TSH level check. Pt also with c/o pinkish-brown vaginal discharge ongoing for the past 3 weeks without associated abdominal pain, vaginal itching, dysuria. Denies any trauma, pt not sexually active x1 yr. States discharge is spotting-like in character, not malodorous.

## 2021-04-25 NOTE — REVIEW OF SYSTEMS
[Vaginal Discharge] : vaginal discharge [Hair Changes] : hair changes [Fever] : no fever [Chills] : no chills [Vision Problems] : no vision problems [Nasal Discharge] : no nasal discharge [Sore Throat] : no sore throat [Chest Pain] : no chest pain [Palpitations] : no palpitations [Shortness Of Breath] : no shortness of breath [Cough] : no cough [Abdominal Pain] : no abdominal pain [Nausea] : no nausea [Constipation] : no constipation [Diarrhea] : diarrhea [Vomiting] : no vomiting [Dysuria] : no dysuria [Hematuria] : no hematuria [Skin Rash] : no skin rash [Headache] : no headache [Dizziness] : no dizziness [Fainting] : no fainting [Confusion] : no confusion [de-identified] : + dry skin

## 2021-04-26 LAB — TSH SERPL-ACNC: 0.26 UIU/ML

## 2021-04-27 ENCOUNTER — NON-APPOINTMENT (OUTPATIENT)
Age: 61
End: 2021-04-27

## 2021-04-27 DIAGNOSIS — E03.9 HYPOTHYROIDISM, UNSPECIFIED: ICD-10-CM

## 2021-04-27 DIAGNOSIS — N89.8 OTHER SPECIFIED NONINFLAMMATORY DISORDERS OF VAGINA: ICD-10-CM

## 2021-04-29 ENCOUNTER — OUTPATIENT (OUTPATIENT)
Dept: OUTPATIENT SERVICES | Facility: HOSPITAL | Age: 61
LOS: 1 days | End: 2021-04-29
Payer: COMMERCIAL

## 2021-04-29 ENCOUNTER — RESULT REVIEW (OUTPATIENT)
Age: 61
End: 2021-04-29

## 2021-04-29 DIAGNOSIS — N93.9 ABNORMAL UTERINE AND VAGINAL BLEEDING, UNSPECIFIED: ICD-10-CM

## 2021-04-29 PROCEDURE — 76856 US EXAM PELVIC COMPLETE: CPT

## 2021-04-29 PROCEDURE — 76856 US EXAM PELVIC COMPLETE: CPT | Mod: 26,59

## 2021-04-29 PROCEDURE — 76830 TRANSVAGINAL US NON-OB: CPT

## 2021-04-29 PROCEDURE — 76830 TRANSVAGINAL US NON-OB: CPT | Mod: 26

## 2021-05-01 ENCOUNTER — OUTPATIENT (OUTPATIENT)
Dept: OUTPATIENT SERVICES | Facility: HOSPITAL | Age: 61
LOS: 1 days | End: 2021-05-01
Payer: COMMERCIAL

## 2021-05-01 ENCOUNTER — APPOINTMENT (OUTPATIENT)
Dept: FAMILY MEDICINE | Facility: HOSPITAL | Age: 61
End: 2021-05-01

## 2021-05-01 VITALS
DIASTOLIC BLOOD PRESSURE: 85 MMHG | OXYGEN SATURATION: 98 % | SYSTOLIC BLOOD PRESSURE: 127 MMHG | BODY MASS INDEX: 27.34 KG/M2 | TEMPERATURE: 97.5 F | WEIGHT: 140 LBS | HEART RATE: 71 BPM | RESPIRATION RATE: 16 BRPM

## 2021-05-01 DIAGNOSIS — Z00.00 ENCOUNTER FOR GENERAL ADULT MEDICAL EXAMINATION WITHOUT ABNORMAL FINDINGS: ICD-10-CM

## 2021-05-01 PROCEDURE — G0463: CPT

## 2021-05-01 NOTE — HISTORY OF PRESENT ILLNESS
[de-identified] : 62 yo female with hypothyroidism, h/o anxiety, HLD following up today regarding brownish vaginal discharge concerning for post-menopausal bleed for which she was sent for TVUS to evaluate endometrium. Pt today reports brownish discharge is still present but slightly better than before. Has no vaginal itching or dysuria. Of note, UA obtained on last clinic visit was negative.\par \par TVUS shows normal endometrial thickness of 4mm. +subserosal uterus fibroids measuring 3.8x4.0x3.9cm and 2.6x3.0x2.9cm. Pt reports known hx of fibroid premenopausal with past h/o menorrhagia.

## 2021-05-01 NOTE — REVIEW OF SYSTEMS
[Fever] : no fever [Chills] : no chills [Chest Pain] : no chest pain [Shortness Of Breath] : no shortness of breath [Cough] : no cough [Dysuria] : no dysuria [Incontinence] : no incontinence [Hematuria] : no hematuria [Vaginal Discharge] : vaginal discharge [Headache] : no headache [Dizziness] : no dizziness [Confusion] : no confusion

## 2021-05-01 NOTE — PHYSICAL EXAM
[No Acute Distress] : no acute distress [EOMI] : extraocular movements intact [Normal Sclera/Conjunctiva] : normal sclera/conjunctiva [No Respiratory Distress] : no respiratory distress  [Clear to Auscultation] : lungs were clear to auscultation bilaterally [Normal Rate] : normal rate  [Regular Rhythm] : with a regular rhythm [Normal S1, S2] : normal S1 and S2 [No Murmur] : no murmur heard [Soft] : abdomen soft [Non Tender] : non-tender [Non-distended] : non-distended [Normal Bowel Sounds] : normal bowel sounds [No Masses] : no abdominal mass palpated [No Focal Deficits] : no focal deficits [Normal Affect] : the affect was normal [Normal Insight/Judgement] : insight and judgment were intact

## 2021-05-03 DIAGNOSIS — N93.9 ABNORMAL UTERINE AND VAGINAL BLEEDING, UNSPECIFIED: ICD-10-CM

## 2021-05-14 ENCOUNTER — OUTPATIENT (OUTPATIENT)
Dept: OUTPATIENT SERVICES | Facility: HOSPITAL | Age: 61
LOS: 1 days | End: 2021-05-14
Payer: COMMERCIAL

## 2021-05-14 ENCOUNTER — APPOINTMENT (OUTPATIENT)
Dept: FAMILY MEDICINE | Facility: HOSPITAL | Age: 61
End: 2021-05-14

## 2021-05-14 VITALS
TEMPERATURE: 98 F | OXYGEN SATURATION: 94 % | RESPIRATION RATE: 94 BRPM | DIASTOLIC BLOOD PRESSURE: 80 MMHG | BODY MASS INDEX: 27.34 KG/M2 | SYSTOLIC BLOOD PRESSURE: 150 MMHG | WEIGHT: 140 LBS

## 2021-05-14 DIAGNOSIS — Z00.00 ENCOUNTER FOR GENERAL ADULT MEDICAL EXAMINATION WITHOUT ABNORMAL FINDINGS: ICD-10-CM

## 2021-05-14 PROCEDURE — 82274 ASSAY TEST FOR BLOOD FECAL: CPT

## 2021-05-14 PROCEDURE — G0463: CPT

## 2021-05-14 PROCEDURE — 87800 DETECT AGNT MULT DNA DIREC: CPT

## 2021-05-15 DIAGNOSIS — N93.9 ABNORMAL UTERINE AND VAGINAL BLEEDING, UNSPECIFIED: ICD-10-CM

## 2021-05-15 DIAGNOSIS — N89.8 OTHER SPECIFIED NONINFLAMMATORY DISORDERS OF VAGINA: ICD-10-CM

## 2021-05-17 NOTE — PLAN
[FreeTextEntry1] : \par \par RTC 6 months  to f/u vaginal bleeding or PRN\par Discussed with Dr. Piña

## 2021-05-17 NOTE — REVIEW OF SYSTEMS
[Vaginal Discharge] : vaginal discharge [Negative] : Musculoskeletal [Fever] : no fever [Chills] : no chills [Fatigue] : no fatigue [Chest Pain] : no chest pain [Shortness Of Breath] : no shortness of breath [Cough] : no cough [Dysuria] : no dysuria [Headache] : no headache [Dizziness] : no dizziness [Fainting] : no fainting [Easy Bleeding] : no easy bleeding

## 2021-05-17 NOTE — HISTORY OF PRESENT ILLNESS
[Patient Declined  Services] : - None: Patient declined  services [FreeTextEntry1] : f/u post menopausal bleeding [de-identified] : 61 year old F w/ hypothyroidism, HLD, anxiety presenting to clinic to f/u postmenopausal bleeding. States that she has had vaginal bleeding since ~ April. States that the discharge has been improving over time. Denies odor. \par \par US reveiwed with patient- significant for 4mm Endometrium and 2 subserosal fibroids. \par \par patient also requesting new FIT Kit as she lost the one given in March 2021. \par

## 2021-06-18 ENCOUNTER — OUTPATIENT (OUTPATIENT)
Dept: OUTPATIENT SERVICES | Facility: HOSPITAL | Age: 61
LOS: 1 days | End: 2021-06-18
Payer: COMMERCIAL

## 2021-06-18 ENCOUNTER — APPOINTMENT (OUTPATIENT)
Dept: FAMILY MEDICINE | Facility: HOSPITAL | Age: 61
End: 2021-06-18

## 2021-06-18 VITALS
SYSTOLIC BLOOD PRESSURE: 116 MMHG | HEART RATE: 72 BPM | TEMPERATURE: 72 F | RESPIRATION RATE: 12 BRPM | HEIGHT: 60 IN | OXYGEN SATURATION: 98 % | DIASTOLIC BLOOD PRESSURE: 81 MMHG | BODY MASS INDEX: 27.68 KG/M2 | WEIGHT: 141 LBS

## 2021-06-18 DIAGNOSIS — Z00.00 ENCOUNTER FOR GENERAL ADULT MEDICAL EXAMINATION WITHOUT ABNORMAL FINDINGS: ICD-10-CM

## 2021-06-18 PROCEDURE — 84443 ASSAY THYROID STIM HORMONE: CPT

## 2021-06-18 PROCEDURE — G0463: CPT

## 2021-06-18 NOTE — HISTORY OF PRESENT ILLNESS
[de-identified] : 60 yo female with h/o hypothyroidism (on synthroid), presenting today for follow up of vaginal discharge for which workup has been negative so far. Pt reports today improvement in the discharge, states now beginning to appear like normal discharge,without odor, bleed or associated vaginal itch or pain. \par Received Eric and Eric vaccine on 5/25/2021.

## 2021-06-18 NOTE — PHYSICAL EXAM
[No Acute Distress] : no acute distress [Well Developed] : well developed [Well-Appearing] : well-appearing [Normal Sclera/Conjunctiva] : normal sclera/conjunctiva [PERRL] : pupils equal round and reactive to light [EOMI] : extraocular movements intact [Supple] : supple [Thyroid Normal, No Nodules] : the thyroid was normal and there were no nodules present [No Respiratory Distress] : no respiratory distress  [Clear to Auscultation] : lungs were clear to auscultation bilaterally [Normal Rate] : normal rate  [Regular Rhythm] : with a regular rhythm [Normal S1, S2] : normal S1 and S2 [No Murmur] : no murmur heard [Grossly Normal Strength/Tone] : grossly normal strength/tone [No Focal Deficits] : no focal deficits [Normal Affect] : the affect was normal [Normal Insight/Judgement] : insight and judgment were intact

## 2021-06-18 NOTE — REVIEW OF SYSTEMS
[Vaginal Discharge] : vaginal discharge [Negative] : Neurological [Dysuria] : no dysuria [Incontinence] : no incontinence [Itching] : no itching [Skin Rash] : no skin rash [FreeTextEntry8] : improving vaginal discharge [de-identified] : improving nails and hair loss from previous

## 2021-06-21 DIAGNOSIS — N89.8 OTHER SPECIFIED NONINFLAMMATORY DISORDERS OF VAGINA: ICD-10-CM

## 2021-06-21 DIAGNOSIS — E03.9 HYPOTHYROIDISM, UNSPECIFIED: ICD-10-CM

## 2021-06-22 ENCOUNTER — NON-APPOINTMENT (OUTPATIENT)
Age: 61
End: 2021-06-22

## 2021-06-22 LAB
CANDIDA VAG CYTO: NOT DETECTED
G VAGINALIS+PREV SP MTYP VAG QL MICRO: NOT DETECTED
HEMOCCULT STL QL IA: NEGATIVE
T VAGINALIS VAG QL WET PREP: NOT DETECTED
TSH SERPL-ACNC: 1.6 UIU/ML

## 2022-01-11 ENCOUNTER — TRANSCRIPTION ENCOUNTER (OUTPATIENT)
Age: 62
End: 2022-01-11

## 2022-03-04 ENCOUNTER — RESULT REVIEW (OUTPATIENT)
Age: 62
End: 2022-03-04

## 2022-03-04 ENCOUNTER — APPOINTMENT (OUTPATIENT)
Dept: FAMILY MEDICINE | Facility: HOSPITAL | Age: 62
End: 2022-03-04

## 2022-03-04 ENCOUNTER — OUTPATIENT (OUTPATIENT)
Dept: OUTPATIENT SERVICES | Facility: HOSPITAL | Age: 62
LOS: 1 days | End: 2022-03-04
Payer: COMMERCIAL

## 2022-03-04 VITALS
RESPIRATION RATE: 12 BRPM | OXYGEN SATURATION: 98 % | HEART RATE: 77 BPM | BODY MASS INDEX: 25.78 KG/M2 | TEMPERATURE: 97 F | WEIGHT: 132 LBS | DIASTOLIC BLOOD PRESSURE: 89 MMHG | SYSTOLIC BLOOD PRESSURE: 147 MMHG

## 2022-03-04 DIAGNOSIS — Z00.00 ENCOUNTER FOR GENERAL ADULT MEDICAL EXAMINATION W/OUT ABNORMAL FINDINGS: ICD-10-CM

## 2022-03-04 DIAGNOSIS — Z00.00 ENCOUNTER FOR GENERAL ADULT MEDICAL EXAMINATION WITHOUT ABNORMAL FINDINGS: ICD-10-CM

## 2022-03-04 LAB
ALBUMIN SERPL ELPH-MCNC: 4.6 G/DL
ALP BLD-CCNC: 92 U/L
ALT SERPL-CCNC: 11 U/L
ANION GAP SERPL CALC-SCNC: 12 MMOL/L
AST SERPL-CCNC: 17 U/L
BASOPHILS # BLD AUTO: 0.06 K/UL
BASOPHILS NFR BLD AUTO: 1.4 %
BILIRUB SERPL-MCNC: 0.6 MG/DL
BUN SERPL-MCNC: 13 MG/DL
CALCIUM SERPL-MCNC: 9.7 MG/DL
CHLORIDE SERPL-SCNC: 103 MMOL/L
CHOLEST SERPL-MCNC: 267 MG/DL
CO2 SERPL-SCNC: 26 MMOL/L
CREAT SERPL-MCNC: 0.6 MG/DL
EGFR: 102 ML/MIN/1.73M2
EOSINOPHIL # BLD AUTO: 0.17 K/UL
EOSINOPHIL NFR BLD AUTO: 3.8 %
GLUCOSE SERPL-MCNC: 110 MG/DL
HCT VFR BLD CALC: 42.6 %
HDLC SERPL-MCNC: 69 MG/DL
HGB BLD-MCNC: 14.2 G/DL
IMM GRANULOCYTES NFR BLD AUTO: 0.2 %
LDLC SERPL CALC-MCNC: 172 MG/DL
LYMPHOCYTES # BLD AUTO: 1.7 K/UL
LYMPHOCYTES NFR BLD AUTO: 38.3 %
MAN DIFF?: NORMAL
MCHC RBC-ENTMCNC: 29.3 PG
MCHC RBC-ENTMCNC: 33.3 GM/DL
MCV RBC AUTO: 87.8 FL
MONOCYTES # BLD AUTO: 0.49 K/UL
MONOCYTES NFR BLD AUTO: 11 %
NEUTROPHILS # BLD AUTO: 2.01 K/UL
NEUTROPHILS NFR BLD AUTO: 45.3 %
NONHDLC SERPL-MCNC: 198 MG/DL
PLATELET # BLD AUTO: 294 K/UL
POTASSIUM SERPL-SCNC: 4.6 MMOL/L
PROT SERPL-MCNC: 7.5 G/DL
RBC # BLD: 4.85 M/UL
RBC # FLD: 14.1 %
SODIUM SERPL-SCNC: 140 MMOL/L
TRIGL SERPL-MCNC: 131 MG/DL
TSH SERPL-ACNC: 0.46 UIU/ML
WBC # FLD AUTO: 4.44 K/UL

## 2022-03-04 PROCEDURE — 80307 DRUG TEST PRSMV CHEM ANLYZR: CPT

## 2022-03-04 PROCEDURE — 85025 COMPLETE CBC W/AUTO DIFF WBC: CPT

## 2022-03-04 PROCEDURE — 84443 ASSAY THYROID STIM HORMONE: CPT

## 2022-03-04 PROCEDURE — G0463: CPT

## 2022-03-04 PROCEDURE — 80061 LIPID PANEL: CPT

## 2022-03-04 PROCEDURE — 83036 HEMOGLOBIN GLYCOSYLATED A1C: CPT

## 2022-03-04 PROCEDURE — 80053 COMPREHEN METABOLIC PANEL: CPT

## 2022-03-04 NOTE — ASSESSMENT
[FreeTextEntry1] : #HCM\par -CBC, CMP, A1c, Lipids\par -Urine drug screen requested by patient for job forms\par -F/u Wed/Thurs for COVID-19 vaccine for work

## 2022-03-04 NOTE — HISTORY OF PRESENT ILLNESS
[de-identified] : 62F w/ hypothyroidism on levothyroxine here for f/u on hypothyroidism, also willing to have CPE today. Last seen in clinic June 2021. 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 She was diagnosed with COVID-19 in January 2022 and was mildly symptomatic without care from the ED or hospitalization. Now she is feeling well without any known residual symptoms. She is  from her  since 1 year ago, lives alone at home but is excited because he daughter is coming in 2 weeks to move in with her. She has no other acute complaints at this time.  [FreeTextEntry1] : Hypothyroidism

## 2022-03-04 NOTE — REVIEW OF SYSTEMS
[Negative] : Heme/Lymph [Anxiety] : anxiety [Suicidal] : not suicidal [Insomnia] : no insomnia [Depression] : no depression [de-identified] : Occasional episodes of mild anxiety, but no functional inhibition, no impairment in day-to-day life. No history of overt panic attacks.

## 2022-03-04 NOTE — HEALTH RISK ASSESSMENT
[Good] : ~his/her~  mood as  good [Never] : Never [0-4] : 0-4 [No] : No [Never (0 pts)] : Never (0 points) [1 or 2 (0 pts)] : 1 or 2 (0 points) [No falls in past year] : Patient reported no falls in the past year [1] : 1) Little interest or pleasure doing things for several days (1) [0] : 2) Feeling down, depressed, or hopeless: Not at all (0) [PHQ-2 Negative - No further assessment needed] : PHQ-2 Negative - No further assessment needed [HIV test declined] : HIV test declined [Hepatitis C test declined] : Hepatitis C test declined [Alone] : lives alone [Employed] : employed [Less Than High School] : less than high school [] :  [Feels Safe at Home] : Feels safe at home [Fully functional (bathing, dressing, toileting, transferring, walking, feeding)] : Fully functional (bathing, dressing, toileting, transferring, walking, feeding) [Fully functional (using the telephone, shopping, preparing meals, housekeeping, doing laundry, using] : Fully functional and needs no help or supervision to perform IADLs (using the telephone, shopping, preparing meals, housekeeping, doing laundry, using transportation, managing medications and managing finances) [Smoke Detector] : smoke detector [[id number]] : 1 [Sexually Active] : not sexually active [Reports changes in hearing] : Reports no changes in hearing [Reports changes in vision] : Reports no changes in vision [Reports changes in dental health] : Reports no changes in dental health

## 2022-03-06 LAB
ESTIMATED AVERAGE GLUCOSE: 131 MG/DL
HBA1C MFR BLD HPLC: 6.2 %

## 2022-03-07 DIAGNOSIS — E03.9 HYPOTHYROIDISM, UNSPECIFIED: ICD-10-CM

## 2022-03-07 DIAGNOSIS — Z12.39 ENCOUNTER FOR OTHER SCREENING FOR MALIGNANT NEOPLASM OF BREAST: ICD-10-CM

## 2022-03-07 LAB
AMPHET UR-MCNC: NEGATIVE
BARBITURATES UR-MCNC: NEGATIVE
BENZODIAZ UR-MCNC: NEGATIVE
COCAINE METAB.OTHER UR-MCNC: NEGATIVE
CREATININE, URINE: 157.4 MG/DL
METHADONE UR-MCNC: NEGATIVE
METHAQUALONE UR-MCNC: NEGATIVE
OPIATES UR-MCNC: NEGATIVE
PCP UR-MCNC: NEGATIVE
PROPOXYPH UR QL: NEGATIVE
THC UR QL: NEGATIVE

## 2022-03-10 ENCOUNTER — OUTPATIENT (OUTPATIENT)
Dept: OUTPATIENT SERVICES | Facility: HOSPITAL | Age: 62
LOS: 1 days | End: 2022-03-10
Payer: COMMERCIAL

## 2022-03-10 ENCOUNTER — APPOINTMENT (OUTPATIENT)
Dept: FAMILY MEDICINE | Facility: HOSPITAL | Age: 62
End: 2022-03-10

## 2022-03-10 VITALS
TEMPERATURE: 97 F | HEART RATE: 68 BPM | SYSTOLIC BLOOD PRESSURE: 128 MMHG | RESPIRATION RATE: 15 BRPM | DIASTOLIC BLOOD PRESSURE: 84 MMHG | OXYGEN SATURATION: 99 %

## 2022-03-10 DIAGNOSIS — Z00.00 ENCOUNTER FOR GENERAL ADULT MEDICAL EXAMINATION WITHOUT ABNORMAL FINDINGS: ICD-10-CM

## 2022-03-10 PROCEDURE — G0463: CPT

## 2022-03-11 DIAGNOSIS — E03.9 HYPOTHYROIDISM, UNSPECIFIED: ICD-10-CM

## 2022-03-11 DIAGNOSIS — Z23 ENCOUNTER FOR IMMUNIZATION: ICD-10-CM

## 2022-03-22 NOTE — HISTORY OF PRESENT ILLNESS
[FreeTextEntry1] : 61 y/o F presents for COVID-19 booster [de-identified] : 61 y/o F PMH hypothyroidism, HLD presents for COVID-19 booster. Pt last seen 1 week ago for CPE. Pt had elevated /89 last visit, was encouraged to get BP monitor and f/u BP in 2 weeks. Pt /84 today. No acute complaints. Denies fever, chills, chest pain, SOB, N/V, abdominal pain, headache, cough, palpitations, leg pain, dizziness, weakness. Pt most recent TSH 0.46, takes levothyroxine as prescribed. \par

## 2022-04-06 ENCOUNTER — APPOINTMENT (OUTPATIENT)
Dept: MAMMOGRAPHY | Facility: IMAGING CENTER | Age: 62
End: 2022-04-06
Payer: COMMERCIAL

## 2022-04-06 ENCOUNTER — RESULT REVIEW (OUTPATIENT)
Age: 62
End: 2022-04-06

## 2022-04-06 ENCOUNTER — OUTPATIENT (OUTPATIENT)
Dept: OUTPATIENT SERVICES | Facility: HOSPITAL | Age: 62
LOS: 1 days | End: 2022-04-06
Payer: COMMERCIAL

## 2022-04-06 DIAGNOSIS — Z12.39 ENCOUNTER FOR OTHER SCREENING FOR MALIGNANT NEOPLASM OF BREAST: ICD-10-CM

## 2022-04-06 PROCEDURE — 77063 BREAST TOMOSYNTHESIS BI: CPT | Mod: 26

## 2022-04-06 PROCEDURE — 77067 SCR MAMMO BI INCL CAD: CPT

## 2022-04-06 PROCEDURE — 77067 SCR MAMMO BI INCL CAD: CPT | Mod: 26

## 2022-04-06 PROCEDURE — 77063 BREAST TOMOSYNTHESIS BI: CPT

## 2022-08-26 ENCOUNTER — APPOINTMENT (OUTPATIENT)
Dept: FAMILY MEDICINE | Facility: HOSPITAL | Age: 62
End: 2022-08-26

## 2022-08-26 ENCOUNTER — OUTPATIENT (OUTPATIENT)
Dept: OUTPATIENT SERVICES | Facility: HOSPITAL | Age: 62
LOS: 1 days | End: 2022-08-26
Payer: COMMERCIAL

## 2022-08-26 VITALS
RESPIRATION RATE: 15 BRPM | TEMPERATURE: 98.2 F | SYSTOLIC BLOOD PRESSURE: 127 MMHG | WEIGHT: 130 LBS | HEART RATE: 110 BPM | BODY MASS INDEX: 25.39 KG/M2 | DIASTOLIC BLOOD PRESSURE: 85 MMHG | OXYGEN SATURATION: 94 %

## 2022-08-26 DIAGNOSIS — Z00.00 ENCOUNTER FOR GENERAL ADULT MEDICAL EXAMINATION WITHOUT ABNORMAL FINDINGS: ICD-10-CM

## 2022-08-26 PROCEDURE — G0463: CPT

## 2022-08-26 PROCEDURE — 84443 ASSAY THYROID STIM HORMONE: CPT

## 2022-08-26 PROCEDURE — 82274 ASSAY TEST FOR BLOOD FECAL: CPT

## 2022-08-26 PROCEDURE — 36415 COLL VENOUS BLD VENIPUNCTURE: CPT

## 2022-08-26 PROCEDURE — 87624 HPV HI-RISK TYP POOLED RSLT: CPT

## 2022-08-26 NOTE — HISTORY OF PRESENT ILLNESS
[de-identified] : 63 YO F with a PMH of Hypothyroidism, HLD presents for follow up of symptoms including tachycardia, which patient attributes to getting angry, Patient states that she is worried about her thyroid levels as that they were last drawn in May 2022. Patient is taking her levothyroxine 75 mcg as prescribed.

## 2022-08-26 NOTE — END OF VISIT
[FreeTextEntry3] : Patient received Pap smear with HPV cotesting. Her hypothyroidism is currently being managed with levothyroxine, will adjust the administration scheduling to prevent medication induced hyperthyroidism. FIT testing for cancer prevention.

## 2022-08-27 DIAGNOSIS — F41.9 ANXIETY DISORDER, UNSPECIFIED: ICD-10-CM

## 2022-08-27 DIAGNOSIS — Z12.4 ENCOUNTER FOR SCREENING FOR MALIGNANT NEOPLASM OF CERVIX: ICD-10-CM

## 2022-08-27 DIAGNOSIS — E03.9 HYPOTHYROIDISM, UNSPECIFIED: ICD-10-CM

## 2022-09-14 LAB
CYTOLOGY CVX/VAG DOC THIN PREP: ABNORMAL
HEMOCCULT STL QL IA: NEGATIVE
HPV HIGH+LOW RISK DNA PNL CVX: NOT DETECTED
TSH SERPL-ACNC: 1.32 UIU/ML

## 2022-09-24 ENCOUNTER — OUTPATIENT (OUTPATIENT)
Dept: OUTPATIENT SERVICES | Facility: HOSPITAL | Age: 62
LOS: 1 days | End: 2022-09-24
Payer: COMMERCIAL

## 2022-09-24 ENCOUNTER — APPOINTMENT (OUTPATIENT)
Dept: FAMILY MEDICINE | Facility: HOSPITAL | Age: 62
End: 2022-09-24

## 2022-09-24 VITALS
DIASTOLIC BLOOD PRESSURE: 79 MMHG | WEIGHT: 133 LBS | RESPIRATION RATE: 16 BRPM | HEART RATE: 63 BPM | BODY MASS INDEX: 25.98 KG/M2 | TEMPERATURE: 97.5 F | OXYGEN SATURATION: 99 % | SYSTOLIC BLOOD PRESSURE: 122 MMHG

## 2022-09-24 DIAGNOSIS — Z87.42 PERSONAL HISTORY OF OTHER DISEASES OF THE FEMALE GENITAL TRACT: ICD-10-CM

## 2022-09-24 DIAGNOSIS — R03.0 ELEVATED BLOOD-PRESSURE READING, W/OUT DIAGNOSIS OF HYPERTENSION: ICD-10-CM

## 2022-09-24 DIAGNOSIS — Z29.9 ENCOUNTER FOR PROPHYLACTIC MEASURES, UNSPECIFIED: ICD-10-CM

## 2022-09-24 DIAGNOSIS — Z12.4 ENCOUNTER FOR SCREENING FOR MALIGNANT NEOPLASM OF CERVIX: ICD-10-CM

## 2022-09-24 DIAGNOSIS — Z00.00 ENCOUNTER FOR GENERAL ADULT MEDICAL EXAMINATION WITHOUT ABNORMAL FINDINGS: ICD-10-CM

## 2022-09-24 PROCEDURE — G0463: CPT

## 2022-09-25 PROBLEM — Z12.4 ENCOUNTER FOR PAP SMEAR OF CERVIX WITH HPV DNA COTESTING: Status: RESOLVED | Noted: 2018-10-26 | Resolved: 2022-09-25

## 2022-09-25 PROBLEM — Z29.9 PREVENTIVE MEASURE: Status: ACTIVE | Noted: 2022-09-25

## 2022-09-25 PROBLEM — R03.0 ELEVATED BLOOD PRESSURE READING: Status: RESOLVED | Noted: 2020-01-11 | Resolved: 2022-09-25

## 2022-09-25 PROBLEM — Z87.42 HISTORY OF VAGINAL DISCHARGE: Status: RESOLVED | Noted: 2021-04-24 | Resolved: 2022-03-04

## 2022-09-25 PROBLEM — Z87.42 HISTORY OF VAGINAL BLEEDING: Status: RESOLVED | Noted: 2021-04-24 | Resolved: 2022-03-04

## 2022-09-25 NOTE — HISTORY OF PRESENT ILLNESS
[FreeTextEntry1] : 61 y/o F presents for f/u [de-identified] : 63 y/o F PMH hypothyroidism, prediabetes, HLD presents for f/u hypothyroidism. Pt last seen one month ago for f/u hypothyroidism. Pt was having sxs of weight loss, tachycardia and has been worried about her thyroid levels. Pt was told to hold levothyroxine one day a week. Pt states currently states symptoms are improved and currently taking levothyroxine everyday. TSH was checked last visit was 1.32. Denies fever, chills, chest pain, SOB, N/V, abdominal pain, headache, cough, palpitations, leg pain, dizziness, weakness\par

## 2022-09-25 NOTE — REVIEW OF SYSTEMS
[Fever] : no fever [Chills] : no chills [Fatigue] : no fatigue [Hot Flashes] : no hot flashes [Night Sweats] : no night sweats [Recent Change In Weight] : ~T no recent weight change [Chest Pain] : no chest pain [Palpitations] : no palpitations [Leg Claudication] : no leg claudication [Lower Ext Edema] : no lower extremity edema [Shortness Of Breath] : no shortness of breath [Wheezing] : no wheezing [Cough] : no cough [Dyspnea on Exertion] : no dyspnea on exertion [Abdominal Pain] : no abdominal pain [Nausea] : no nausea [Constipation] : no constipation [Diarrhea] : diarrhea [Vomiting] : no vomiting [Heartburn] : no heartburn [Melena] : no melena [Nail Changes] : no nail changes [Hair Changes] : no hair changes [Skin Rash] : no skin rash [Headache] : no headache [Dizziness] : no dizziness [Fainting] : no fainting

## 2022-09-26 DIAGNOSIS — E03.9 HYPOTHYROIDISM, UNSPECIFIED: ICD-10-CM

## 2022-09-26 DIAGNOSIS — Z29.9 ENCOUNTER FOR PROPHYLACTIC MEASURES, UNSPECIFIED: ICD-10-CM

## 2022-09-26 DIAGNOSIS — F41.9 ANXIETY DISORDER, UNSPECIFIED: ICD-10-CM

## 2022-09-26 DIAGNOSIS — R73.03 PREDIABETES: ICD-10-CM

## 2023-03-06 ENCOUNTER — APPOINTMENT (OUTPATIENT)
Dept: FAMILY MEDICINE | Facility: HOSPITAL | Age: 63
End: 2023-03-06

## 2023-03-06 ENCOUNTER — OUTPATIENT (OUTPATIENT)
Dept: OUTPATIENT SERVICES | Facility: HOSPITAL | Age: 63
LOS: 1 days | End: 2023-03-06
Payer: COMMERCIAL

## 2023-03-06 VITALS
OXYGEN SATURATION: 97 % | TEMPERATURE: 98 F | DIASTOLIC BLOOD PRESSURE: 86 MMHG | RESPIRATION RATE: 16 BRPM | BODY MASS INDEX: 25.39 KG/M2 | HEART RATE: 70 BPM | SYSTOLIC BLOOD PRESSURE: 140 MMHG | WEIGHT: 130 LBS

## 2023-03-06 DIAGNOSIS — Z00.00 ENCOUNTER FOR GENERAL ADULT MEDICAL EXAMINATION WITHOUT ABNORMAL FINDINGS: ICD-10-CM

## 2023-03-06 PROCEDURE — G0463: CPT

## 2023-03-06 PROCEDURE — 83036 HEMOGLOBIN GLYCOSYLATED A1C: CPT

## 2023-03-06 PROCEDURE — 84443 ASSAY THYROID STIM HORMONE: CPT

## 2023-03-06 PROCEDURE — 80053 COMPREHEN METABOLIC PANEL: CPT

## 2023-03-06 PROCEDURE — 85025 COMPLETE CBC W/AUTO DIFF WBC: CPT

## 2023-03-07 DIAGNOSIS — E03.9 HYPOTHYROIDISM, UNSPECIFIED: ICD-10-CM

## 2023-03-07 DIAGNOSIS — F41.9 ANXIETY DISORDER, UNSPECIFIED: ICD-10-CM

## 2023-03-21 LAB
ALBUMIN SERPL ELPH-MCNC: 4.6 G/DL
ALP BLD-CCNC: 93 U/L
ALT SERPL-CCNC: 17 U/L
ANION GAP SERPL CALC-SCNC: 14 MMOL/L
AST SERPL-CCNC: 19 U/L
BASOPHILS # BLD AUTO: 0.06 K/UL
BASOPHILS NFR BLD AUTO: 1 %
BILIRUB SERPL-MCNC: 0.5 MG/DL
BUN SERPL-MCNC: 17 MG/DL
CALCIUM SERPL-MCNC: 10.1 MG/DL
CHLORIDE SERPL-SCNC: 104 MMOL/L
CO2 SERPL-SCNC: 23 MMOL/L
CREAT SERPL-MCNC: 0.7 MG/DL
EGFR: 98 ML/MIN/1.73M2
EOSINOPHIL # BLD AUTO: 0.3 K/UL
EOSINOPHIL NFR BLD AUTO: 5.2 %
ESTIMATED AVERAGE GLUCOSE: 126 MG/DL
GLUCOSE SERPL-MCNC: 96 MG/DL
HBA1C MFR BLD HPLC: 6 %
HCT VFR BLD CALC: 41 %
HGB BLD-MCNC: 13.7 G/DL
IMM GRANULOCYTES NFR BLD AUTO: 0.2 %
LYMPHOCYTES # BLD AUTO: 2.05 K/UL
LYMPHOCYTES NFR BLD AUTO: 35.2 %
MAN DIFF?: NORMAL
MCHC RBC-ENTMCNC: 29.5 PG
MCHC RBC-ENTMCNC: 33.4 GM/DL
MCV RBC AUTO: 88.4 FL
MONOCYTES # BLD AUTO: 0.56 K/UL
MONOCYTES NFR BLD AUTO: 9.6 %
NEUTROPHILS # BLD AUTO: 2.84 K/UL
NEUTROPHILS NFR BLD AUTO: 48.8 %
PLATELET # BLD AUTO: 285 K/UL
POTASSIUM SERPL-SCNC: 4.4 MMOL/L
PROT SERPL-MCNC: 7.6 G/DL
RBC # BLD: 4.64 M/UL
RBC # FLD: 14.1 %
SODIUM SERPL-SCNC: 141 MMOL/L
TSH SERPL-ACNC: 3.43 UIU/ML
WBC # FLD AUTO: 5.82 K/UL

## 2023-03-30 NOTE — HISTORY OF PRESENT ILLNESS
[de-identified] : 62 YO F with a PMH of depression, anxiety, hypothyroidism presents for follow up of hypothyroid symptoms. Patient presents and states that she has been feeling sad since Late November, Patient with less work and more time on her hands, she states that she feels stressed, feels like her hair is falling out. and that her skin and nails are dry. Patient denies any fever, chills, chest pain, shortness of breath, nausea, vomiting, headache, cough, leg pain dizziness or weakness. \par \par

## 2023-03-30 NOTE — ASSESSMENT
[FreeTextEntry1] : Patient to return to clinic for CPE\par Await blood test before sending medication refills\par

## 2023-03-30 NOTE — END OF VISIT
[] : Resident [FreeTextEntry3] : A task created for resident to correct his note about anxiety and depression.

## 2023-04-05 ENCOUNTER — OUTPATIENT (OUTPATIENT)
Dept: OUTPATIENT SERVICES | Facility: HOSPITAL | Age: 63
LOS: 1 days | End: 2023-04-05
Payer: COMMERCIAL

## 2023-04-05 ENCOUNTER — APPOINTMENT (OUTPATIENT)
Dept: FAMILY MEDICINE | Facility: HOSPITAL | Age: 63
End: 2023-04-05

## 2023-04-05 VITALS
OXYGEN SATURATION: 97 % | HEART RATE: 71 BPM | WEIGHT: 129 LBS | SYSTOLIC BLOOD PRESSURE: 131 MMHG | TEMPERATURE: 97.5 F | BODY MASS INDEX: 25.32 KG/M2 | HEIGHT: 60 IN | RESPIRATION RATE: 16 BRPM | DIASTOLIC BLOOD PRESSURE: 88 MMHG

## 2023-04-05 DIAGNOSIS — Z00.00 ENCOUNTER FOR GENERAL ADULT MEDICAL EXAMINATION WITHOUT ABNORMAL FINDINGS: ICD-10-CM

## 2023-04-05 PROCEDURE — G0463: CPT

## 2023-04-05 NOTE — HISTORY OF PRESENT ILLNESS
[de-identified] : 62 YO M with a PMH of depression, anxiety, and hypothyroid. Patient presents for follow up of symptoms. States that she is going through a difficult time as her mom has entered into hospice care and that she is alone at home now. Patient states that she was on Paxil before, and has not taken it since 2020. Patient would like to stay with medication at this time. Advised that she will  probabily need to continue medication indefinetly and that this is not an abortive treatment. Patient denies any fever, chills, chest pain, shortness of breath, nausea, vomiting, headache, cough, leg pain dizziness or weakness. \par \par

## 2023-04-06 ENCOUNTER — TRANSCRIPTION ENCOUNTER (OUTPATIENT)
Age: 63
End: 2023-04-06

## 2023-04-07 DIAGNOSIS — F41.9 ANXIETY DISORDER, UNSPECIFIED: ICD-10-CM

## 2023-04-07 DIAGNOSIS — Z12.39 ENCOUNTER FOR OTHER SCREENING FOR MALIGNANT NEOPLASM OF BREAST: ICD-10-CM

## 2023-04-07 DIAGNOSIS — F32.A DEPRESSION, UNSPECIFIED: ICD-10-CM

## 2023-04-17 RX ORDER — PAROXETINE HYDROCHLORIDE 10 MG/1
10 TABLET, FILM COATED ORAL DAILY
Qty: 90 | Refills: 0 | Status: COMPLETED | COMMUNITY
Start: 2019-12-03 | End: 2023-04-17

## 2023-04-25 ENCOUNTER — NON-APPOINTMENT (OUTPATIENT)
Age: 63
End: 2023-04-25

## 2023-05-31 ENCOUNTER — APPOINTMENT (OUTPATIENT)
Dept: FAMILY MEDICINE | Facility: HOSPITAL | Age: 63
End: 2023-05-31

## 2023-05-31 ENCOUNTER — OUTPATIENT (OUTPATIENT)
Dept: OUTPATIENT SERVICES | Facility: HOSPITAL | Age: 63
LOS: 1 days | End: 2023-05-31
Payer: COMMERCIAL

## 2023-05-31 VITALS
RESPIRATION RATE: 17 BRPM | TEMPERATURE: 98.7 F | SYSTOLIC BLOOD PRESSURE: 128 MMHG | BODY MASS INDEX: 25.39 KG/M2 | DIASTOLIC BLOOD PRESSURE: 77 MMHG | WEIGHT: 130 LBS | HEART RATE: 60 BPM | OXYGEN SATURATION: 100 %

## 2023-05-31 DIAGNOSIS — Z12.39 ENCOUNTER FOR OTHER SCREENING FOR MALIGNANT NEOPLASM OF BREAST: ICD-10-CM

## 2023-05-31 DIAGNOSIS — Z00.00 ENCOUNTER FOR GENERAL ADULT MEDICAL EXAMINATION WITHOUT ABNORMAL FINDINGS: ICD-10-CM

## 2023-05-31 DIAGNOSIS — F41.9 ANXIETY DISORDER, UNSPECIFIED: ICD-10-CM

## 2023-05-31 PROCEDURE — 84443 ASSAY THYROID STIM HORMONE: CPT

## 2023-05-31 PROCEDURE — G0463: CPT

## 2023-06-01 DIAGNOSIS — E03.9 HYPOTHYROIDISM, UNSPECIFIED: ICD-10-CM

## 2023-06-01 DIAGNOSIS — F32.A DEPRESSION, UNSPECIFIED: ICD-10-CM

## 2023-06-01 DIAGNOSIS — F41.9 ANXIETY DISORDER, UNSPECIFIED: ICD-10-CM

## 2023-06-01 LAB — TSH SERPL-ACNC: 2.2 UIU/ML

## 2023-06-11 NOTE — HEALTH RISK ASSESSMENT
[No] : No [Never] : Never [2] : 2) Feeling down, depressed, or hopeless for more than half of the days (2) [Moderate] : severity of depression is moderate [[id number]] : 14
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
## 2023-06-12 ENCOUNTER — APPOINTMENT (OUTPATIENT)
Dept: FAMILY MEDICINE | Facility: HOSPITAL | Age: 63
End: 2023-06-12

## 2023-10-26 ENCOUNTER — NON-APPOINTMENT (OUTPATIENT)
Age: 63
End: 2023-10-26

## 2023-11-06 ENCOUNTER — NON-APPOINTMENT (OUTPATIENT)
Age: 63
End: 2023-11-06

## 2023-11-10 ENCOUNTER — NON-APPOINTMENT (OUTPATIENT)
Age: 63
End: 2023-11-10

## 2024-01-17 ENCOUNTER — NON-APPOINTMENT (OUTPATIENT)
Age: 64
End: 2024-01-17

## 2024-01-18 ENCOUNTER — NON-APPOINTMENT (OUTPATIENT)
Age: 64
End: 2024-01-18

## 2024-01-22 ENCOUNTER — OUTPATIENT (OUTPATIENT)
Dept: OUTPATIENT SERVICES | Facility: HOSPITAL | Age: 64
LOS: 1 days | End: 2024-01-22
Payer: MEDICAID

## 2024-01-22 ENCOUNTER — APPOINTMENT (OUTPATIENT)
Dept: FAMILY MEDICINE | Facility: HOSPITAL | Age: 64
End: 2024-01-22

## 2024-01-22 ENCOUNTER — LABORATORY RESULT (OUTPATIENT)
Age: 64
End: 2024-01-22

## 2024-01-22 VITALS
DIASTOLIC BLOOD PRESSURE: 75 MMHG | WEIGHT: 127 LBS | HEART RATE: 62 BPM | SYSTOLIC BLOOD PRESSURE: 123 MMHG | OXYGEN SATURATION: 100 % | RESPIRATION RATE: 17 BRPM | TEMPERATURE: 98.6 F | BODY MASS INDEX: 24.94 KG/M2 | HEIGHT: 60 IN

## 2024-01-22 DIAGNOSIS — E03.9 HYPOTHYROIDISM, UNSPECIFIED: ICD-10-CM

## 2024-01-22 DIAGNOSIS — Z12.39 ENCOUNTER FOR OTHER SCREENING FOR MALIGNANT NEOPLASM OF BREAST: ICD-10-CM

## 2024-01-22 DIAGNOSIS — Z12.11 ENCOUNTER FOR SCREENING FOR MALIGNANT NEOPLASM OF COLON: ICD-10-CM

## 2024-01-22 DIAGNOSIS — S02.5XXA FRACTURE OF TOOTH (TRAUMATIC), INITIAL ENCOUNTER FOR CLOSED FRACTURE: ICD-10-CM

## 2024-01-22 DIAGNOSIS — Z00.00 ENCOUNTER FOR GENERAL ADULT MEDICAL EXAMINATION W/OUT ABNORMAL FINDINGS: ICD-10-CM

## 2024-01-22 DIAGNOSIS — F32.A DEPRESSION, UNSPECIFIED: ICD-10-CM

## 2024-01-22 DIAGNOSIS — Z00.00 ENCOUNTER FOR GENERAL ADULT MEDICAL EXAMINATION WITHOUT ABNORMAL FINDINGS: ICD-10-CM

## 2024-01-22 DIAGNOSIS — Z23 ENCOUNTER FOR IMMUNIZATION: ICD-10-CM

## 2024-01-22 PROCEDURE — G0463: CPT

## 2024-01-22 PROCEDURE — 83036 HEMOGLOBIN GLYCOSYLATED A1C: CPT

## 2024-01-22 PROCEDURE — 84439 ASSAY OF FREE THYROXINE: CPT

## 2024-01-22 PROCEDURE — 82274 ASSAY TEST FOR BLOOD FECAL: CPT

## 2024-01-22 PROCEDURE — 84443 ASSAY THYROID STIM HORMONE: CPT

## 2024-01-22 RX ORDER — PAROXETINE HYDROCHLORIDE 10 MG/1
10 TABLET, FILM COATED ORAL DAILY
Qty: 90 | Refills: 0 | Status: DISCONTINUED | COMMUNITY
Start: 2023-04-05 | End: 2024-01-22

## 2024-01-22 NOTE — PHYSICAL EXAM
[No Acute Distress] : no acute distress [Well Developed] : well developed [PERRL] : pupils equal round and reactive to light [EOMI] : extraocular movements intact [No Respiratory Distress] : no respiratory distress  [No Accessory Muscle Use] : no accessory muscle use [Clear to Auscultation] : lungs were clear to auscultation bilaterally [Normal Rate] : normal rate  [Regular Rhythm] : with a regular rhythm [Normal S1, S2] : normal S1 and S2 [No Murmur] : no murmur heard [No Edema] : there was no peripheral edema [Soft] : abdomen soft [Non Tender] : non-tender [Non-distended] : non-distended [Normal Bowel Sounds] : normal bowel sounds [No Rash] : no rash [de-identified] : broken teeth upper right
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
## 2024-01-22 NOTE — HEALTH RISK ASSESSMENT
[Good] : ~his/her~ current health as good [Fair] :  ~his/her~ mood as fair [PHQ-9 Positive] : PHQ-9 Positive [None] : None [Alone] : lives alone [Unemployed] : unemployed [] :  [Feels Safe at Home] : Feels safe at home [Fully functional (bathing, dressing, toileting, transferring, walking, feeding)] : Fully functional (bathing, dressing, toileting, transferring, walking, feeding) [Fully functional (using the telephone, shopping, preparing meals, housekeeping, doing laundry, using] : Fully functional and needs no help or supervision to perform IADLs (using the telephone, shopping, preparing meals, housekeeping, doing laundry, using transportation, managing medications and managing finances) [Never] : Never [de-identified] : walking an hour each day  [de-identified] : vegetables and fruits incoporated  [Reports changes in hearing] : Reports no changes in hearing [Reports changes in vision] : Reports no changes in vision

## 2024-01-23 LAB
ESTIMATED AVERAGE GLUCOSE: 120 MG/DL
HBA1C MFR BLD HPLC: 5.8 %

## 2024-01-25 ENCOUNTER — RESULT REVIEW (OUTPATIENT)
Age: 64
End: 2024-01-25

## 2024-01-25 ENCOUNTER — OUTPATIENT (OUTPATIENT)
Dept: OUTPATIENT SERVICES | Facility: HOSPITAL | Age: 64
LOS: 1 days | End: 2024-01-25
Payer: MEDICAID

## 2024-01-25 ENCOUNTER — APPOINTMENT (OUTPATIENT)
Dept: MAMMOGRAPHY | Facility: IMAGING CENTER | Age: 64
End: 2024-01-25
Payer: MEDICAID

## 2024-01-25 DIAGNOSIS — H53.9 UNSPECIFIED VISUAL DISTURBANCE: ICD-10-CM

## 2024-01-25 DIAGNOSIS — Z00.8 ENCOUNTER FOR OTHER GENERAL EXAMINATION: ICD-10-CM

## 2024-01-25 PROCEDURE — 77067 SCR MAMMO BI INCL CAD: CPT | Mod: 26

## 2024-01-25 PROCEDURE — 77063 BREAST TOMOSYNTHESIS BI: CPT | Mod: 26

## 2024-01-25 PROCEDURE — 77067 SCR MAMMO BI INCL CAD: CPT

## 2024-01-25 PROCEDURE — 77063 BREAST TOMOSYNTHESIS BI: CPT

## 2024-02-01 LAB
HEMOCCULT STL QL IA: NEGATIVE
TSH SERPL-ACNC: 6.52 UIU/ML

## 2024-02-02 ENCOUNTER — APPOINTMENT (OUTPATIENT)
Dept: MAMMOGRAPHY | Facility: IMAGING CENTER | Age: 64
End: 2024-02-02
Payer: MEDICAID

## 2024-02-02 ENCOUNTER — RESULT REVIEW (OUTPATIENT)
Age: 64
End: 2024-02-02

## 2024-02-02 ENCOUNTER — OUTPATIENT (OUTPATIENT)
Dept: OUTPATIENT SERVICES | Facility: HOSPITAL | Age: 64
LOS: 1 days | End: 2024-02-02
Payer: MEDICAID

## 2024-02-02 ENCOUNTER — APPOINTMENT (OUTPATIENT)
Dept: ULTRASOUND IMAGING | Facility: IMAGING CENTER | Age: 64
End: 2024-02-02
Payer: MEDICAID

## 2024-02-02 DIAGNOSIS — Z00.8 ENCOUNTER FOR OTHER GENERAL EXAMINATION: ICD-10-CM

## 2024-02-02 PROCEDURE — 77065 DX MAMMO INCL CAD UNI: CPT

## 2024-02-02 PROCEDURE — G0279: CPT | Mod: 26

## 2024-02-02 PROCEDURE — 76642 ULTRASOUND BREAST LIMITED: CPT | Mod: 26,LT

## 2024-02-02 PROCEDURE — G0279: CPT

## 2024-02-02 PROCEDURE — 76642 ULTRASOUND BREAST LIMITED: CPT

## 2024-02-02 PROCEDURE — 77065 DX MAMMO INCL CAD UNI: CPT | Mod: 26,LT

## 2024-02-22 ENCOUNTER — APPOINTMENT (OUTPATIENT)
Dept: FAMILY MEDICINE | Facility: HOSPITAL | Age: 64
End: 2024-02-22

## 2024-03-12 NOTE — REVIEW OF SYSTEMS
[facility]  EMERGENCY DEPARTMENT ENCOUNTER  INDEPENDENT ATTESTATION         No diagnosis found.      Pt Name: Kylee Shine  MRN: [id number]  Birthdate 1990  Date of evaluation: 3/12/24    Kylee Shine is a 33 y.o. female who presents with No chief complaint on file.  .    Based on the medical record, the care appears appropriate. I was personally available for consultation in the Emergency Department.      FINAL IMPRESSION      No diagnosis found.      DISPOSITION / PLAN     DISPOSITION        PATIENT REFERRED TO:  No follow-up provider specified.    DISCHARGE MEDICATIONS:  New Prescriptions    No medications on file       Dr. Yamila Wlaker DO   Attending Emergency Physician        Yamila Walker DO  03/15/24 [id number]     [Negative] : Neurological

## 2024-03-14 DIAGNOSIS — R73.03 PREDIABETES.: ICD-10-CM

## 2024-03-15 ENCOUNTER — OUTPATIENT (OUTPATIENT)
Dept: OUTPATIENT SERVICES | Facility: HOSPITAL | Age: 64
LOS: 1 days | End: 2024-03-15
Payer: MEDICAID

## 2024-03-15 ENCOUNTER — APPOINTMENT (OUTPATIENT)
Dept: FAMILY MEDICINE | Facility: HOSPITAL | Age: 64
End: 2024-03-15

## 2024-03-15 VITALS
WEIGHT: 132 LBS | SYSTOLIC BLOOD PRESSURE: 132 MMHG | OXYGEN SATURATION: 98 % | BODY MASS INDEX: 25.91 KG/M2 | HEART RATE: 70 BPM | HEIGHT: 60 IN | RESPIRATION RATE: 17 BRPM | TEMPERATURE: 98.4 F | DIASTOLIC BLOOD PRESSURE: 77 MMHG

## 2024-03-15 DIAGNOSIS — E03.9 HYPOTHYROIDISM, UNSPECIFIED: ICD-10-CM

## 2024-03-15 DIAGNOSIS — R94.39 ABNORMAL RESULT OF OTHER CARDIOVASCULAR FUNCTION STUDY: ICD-10-CM

## 2024-03-15 DIAGNOSIS — Z00.00 ENCOUNTER FOR GENERAL ADULT MEDICAL EXAMINATION WITHOUT ABNORMAL FINDINGS: ICD-10-CM

## 2024-03-15 PROCEDURE — G0463: CPT

## 2024-03-15 PROCEDURE — 36415 COLL VENOUS BLD VENIPUNCTURE: CPT

## 2024-03-15 PROCEDURE — 84443 ASSAY THYROID STIM HORMONE: CPT

## 2024-03-15 NOTE — HISTORY OF PRESENT ILLNESS
[FreeTextEntry1] : fu  [de-identified] : 65 yo F presents for fu. Seeing therapist for depression, on levothyroxine 88 mcg, latest TSH of 6.52 6 weeks ago with normal T4. Has been on levothyroxine for > 20 yrs. Reports feeling tired, but overall better with therapist.

## 2024-03-18 LAB — TSH SERPL-ACNC: 0.74 UIU/ML

## 2024-12-26 ENCOUNTER — OUTPATIENT (OUTPATIENT)
Dept: OUTPATIENT SERVICES | Facility: HOSPITAL | Age: 64
LOS: 1 days | End: 2024-12-26
Payer: COMMERCIAL

## 2024-12-26 ENCOUNTER — APPOINTMENT (OUTPATIENT)
Dept: FAMILY MEDICINE | Facility: HOSPITAL | Age: 64
End: 2024-12-26

## 2024-12-26 VITALS
OXYGEN SATURATION: 99 % | HEART RATE: 63 BPM | RESPIRATION RATE: 16 BRPM | TEMPERATURE: 98 F | DIASTOLIC BLOOD PRESSURE: 76 MMHG | SYSTOLIC BLOOD PRESSURE: 126 MMHG | BODY MASS INDEX: 25.58 KG/M2 | WEIGHT: 131 LBS

## 2024-12-26 DIAGNOSIS — E03.9 HYPOTHYROIDISM, UNSPECIFIED: ICD-10-CM

## 2024-12-26 DIAGNOSIS — R73.03 PREDIABETES: ICD-10-CM

## 2024-12-26 DIAGNOSIS — F41.9 ANXIETY DISORDER, UNSPECIFIED: ICD-10-CM

## 2024-12-26 DIAGNOSIS — Z00.00 ENCOUNTER FOR GENERAL ADULT MEDICAL EXAMINATION WITHOUT ABNORMAL FINDINGS: ICD-10-CM

## 2024-12-26 DIAGNOSIS — R73.03 PREDIABETES.: ICD-10-CM

## 2025-01-14 ENCOUNTER — OUTPATIENT (OUTPATIENT)
Dept: OUTPATIENT SERVICES | Facility: HOSPITAL | Age: 65
LOS: 1 days | End: 2025-01-14

## 2025-01-14 ENCOUNTER — LABORATORY RESULT (OUTPATIENT)
Age: 65
End: 2025-01-14

## 2025-01-14 DIAGNOSIS — R73.03 PREDIABETES: ICD-10-CM

## 2025-01-14 PROCEDURE — 84439 ASSAY OF FREE THYROXINE: CPT

## 2025-01-14 PROCEDURE — 85025 COMPLETE CBC W/AUTO DIFF WBC: CPT

## 2025-01-14 PROCEDURE — 80061 LIPID PANEL: CPT

## 2025-01-14 PROCEDURE — G0463: CPT

## 2025-01-14 PROCEDURE — 80053 COMPREHEN METABOLIC PANEL: CPT

## 2025-01-14 PROCEDURE — 84443 ASSAY THYROID STIM HORMONE: CPT

## 2025-01-14 PROCEDURE — 83036 HEMOGLOBIN GLYCOSYLATED A1C: CPT

## 2025-01-14 PROCEDURE — 36415 COLL VENOUS BLD VENIPUNCTURE: CPT

## 2025-01-16 ENCOUNTER — APPOINTMENT (OUTPATIENT)
Dept: ULTRASOUND IMAGING | Facility: HOSPITAL | Age: 65
End: 2025-01-16

## 2025-02-06 ENCOUNTER — APPOINTMENT (OUTPATIENT)
Age: 65
End: 2025-02-06

## 2025-02-06 ENCOUNTER — OUTPATIENT (OUTPATIENT)
Dept: OUTPATIENT SERVICES | Facility: HOSPITAL | Age: 65
LOS: 1 days | End: 2025-02-06
Payer: COMMERCIAL

## 2025-02-06 VITALS
HEART RATE: 64 BPM | WEIGHT: 127 LBS | RESPIRATION RATE: 16 BRPM | BODY MASS INDEX: 24.8 KG/M2 | TEMPERATURE: 98.1 F | SYSTOLIC BLOOD PRESSURE: 120 MMHG | DIASTOLIC BLOOD PRESSURE: 72 MMHG | OXYGEN SATURATION: 98 %

## 2025-02-06 DIAGNOSIS — Z23 ENCOUNTER FOR IMMUNIZATION: ICD-10-CM

## 2025-02-06 DIAGNOSIS — R73.03 PREDIABETES.: ICD-10-CM

## 2025-02-06 DIAGNOSIS — Z13.21 ENCOUNTER FOR SCREENING FOR NUTRITIONAL DISORDER: ICD-10-CM

## 2025-02-06 DIAGNOSIS — F41.9 ANXIETY DISORDER, UNSPECIFIED: ICD-10-CM

## 2025-02-06 DIAGNOSIS — Z12.11 ENCOUNTER FOR SCREENING FOR MALIGNANT NEOPLASM OF COLON: ICD-10-CM

## 2025-02-06 DIAGNOSIS — Z00.00 ENCOUNTER FOR GENERAL ADULT MEDICAL EXAMINATION WITHOUT ABNORMAL FINDINGS: ICD-10-CM

## 2025-02-06 DIAGNOSIS — L03.039 CELLULITIS OF UNSPECIFIED TOE: ICD-10-CM

## 2025-02-06 DIAGNOSIS — E03.9 HYPOTHYROIDISM, UNSPECIFIED: ICD-10-CM

## 2025-02-06 DIAGNOSIS — H53.9 UNSPECIFIED VISUAL DISTURBANCE: ICD-10-CM

## 2025-02-06 DIAGNOSIS — R74.8 ABNORMAL LEVELS OF OTHER SERUM ENZYMES: ICD-10-CM

## 2025-02-06 DIAGNOSIS — R73.03 PREDIABETES: ICD-10-CM

## 2025-02-06 DIAGNOSIS — Z12.39 ENCOUNTER FOR OTHER SCREENING FOR MALIGNANT NEOPLASM OF BREAST: ICD-10-CM

## 2025-02-06 DIAGNOSIS — Z00.00 ENCOUNTER FOR GENERAL ADULT MEDICAL EXAMINATION W/OUT ABNORMAL FINDINGS: ICD-10-CM

## 2025-02-06 PROCEDURE — G0463: CPT

## 2025-05-01 ENCOUNTER — OUTPATIENT (OUTPATIENT)
Dept: OUTPATIENT SERVICES | Facility: HOSPITAL | Age: 65
LOS: 1 days | End: 2025-05-01
Payer: COMMERCIAL

## 2025-05-01 ENCOUNTER — APPOINTMENT (OUTPATIENT)
Dept: MAMMOGRAPHY | Facility: IMAGING CENTER | Age: 65
End: 2025-05-01
Payer: COMMERCIAL

## 2025-05-01 ENCOUNTER — RESULT REVIEW (OUTPATIENT)
Age: 65
End: 2025-05-01

## 2025-05-01 DIAGNOSIS — Z12.39 ENCOUNTER FOR OTHER SCREENING FOR MALIGNANT NEOPLASM OF BREAST: ICD-10-CM

## 2025-05-01 PROCEDURE — 77063 BREAST TOMOSYNTHESIS BI: CPT | Mod: 26

## 2025-05-01 PROCEDURE — 77063 BREAST TOMOSYNTHESIS BI: CPT

## 2025-05-01 PROCEDURE — 77067 SCR MAMMO BI INCL CAD: CPT | Mod: 26

## 2025-05-01 PROCEDURE — 77067 SCR MAMMO BI INCL CAD: CPT

## 2025-05-07 ENCOUNTER — OUTPATIENT (OUTPATIENT)
Dept: OUTPATIENT SERVICES | Facility: HOSPITAL | Age: 65
LOS: 1 days | End: 2025-05-07
Payer: COMMERCIAL

## 2025-05-07 ENCOUNTER — APPOINTMENT (OUTPATIENT)
Dept: PODIATRY | Facility: HOSPITAL | Age: 65
End: 2025-05-07
Payer: COMMERCIAL

## 2025-05-07 VITALS
BODY MASS INDEX: 25.11 KG/M2 | HEART RATE: 76 BPM | SYSTOLIC BLOOD PRESSURE: 133 MMHG | TEMPERATURE: 97.9 F | DIASTOLIC BLOOD PRESSURE: 84 MMHG | RESPIRATION RATE: 16 BRPM | HEIGHT: 61 IN | WEIGHT: 133 LBS | OXYGEN SATURATION: 98 %

## 2025-05-07 DIAGNOSIS — B35.1 TINEA UNGUIUM: ICD-10-CM

## 2025-05-07 DIAGNOSIS — B35.3 TINEA PEDIS: ICD-10-CM

## 2025-05-07 DIAGNOSIS — L60.1 ONYCHOLYSIS: ICD-10-CM

## 2025-05-07 DIAGNOSIS — M79.609 PAIN IN UNSPECIFIED LIMB: ICD-10-CM

## 2025-05-07 PROCEDURE — 99203 OFFICE O/P NEW LOW 30 MIN: CPT

## 2025-05-07 PROCEDURE — G0463: CPT

## 2025-05-07 RX ORDER — CICLOPIROX OLAMINE 7.7 MG/G
0.77 CREAM TOPICAL DAILY
Qty: 1 | Refills: 0 | Status: ACTIVE | COMMUNITY
Start: 2025-05-07 | End: 1900-01-01

## 2025-05-07 RX ORDER — CICLOPIROX 71.3 MG/ML
8 SOLUTION TOPICAL
Qty: 1 | Refills: 3 | Status: ACTIVE | COMMUNITY
Start: 2025-05-07 | End: 1900-01-01

## 2025-07-30 ENCOUNTER — OUTPATIENT (OUTPATIENT)
Dept: OUTPATIENT SERVICES | Facility: HOSPITAL | Age: 65
LOS: 1 days | End: 2025-07-30
Payer: COMMERCIAL

## 2025-07-30 ENCOUNTER — APPOINTMENT (OUTPATIENT)
Dept: PODIATRY | Facility: HOSPITAL | Age: 65
End: 2025-07-30
Payer: COMMERCIAL

## 2025-07-30 VITALS
HEIGHT: 61 IN | RESPIRATION RATE: 14 BRPM | SYSTOLIC BLOOD PRESSURE: 129 MMHG | HEART RATE: 70 BPM | WEIGHT: 136 LBS | DIASTOLIC BLOOD PRESSURE: 87 MMHG | TEMPERATURE: 98.2 F | OXYGEN SATURATION: 95 % | BODY MASS INDEX: 25.68 KG/M2

## 2025-07-30 DIAGNOSIS — B35.1 TINEA UNGUIUM: ICD-10-CM

## 2025-07-30 DIAGNOSIS — L60.0 INGROWING NAIL: ICD-10-CM

## 2025-07-30 DIAGNOSIS — M79.609 PAIN IN UNSPECIFIED LIMB: ICD-10-CM

## 2025-07-30 DIAGNOSIS — B35.3 TINEA PEDIS: ICD-10-CM

## 2025-07-30 PROCEDURE — 99213 OFFICE O/P EST LOW 20 MIN: CPT

## 2025-07-30 PROCEDURE — G0463: CPT
